# Patient Record
Sex: FEMALE | Race: WHITE | NOT HISPANIC OR LATINO | Employment: FULL TIME | URBAN - METROPOLITAN AREA
[De-identification: names, ages, dates, MRNs, and addresses within clinical notes are randomized per-mention and may not be internally consistent; named-entity substitution may affect disease eponyms.]

---
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
## 2018-01-12 VITALS
OXYGEN SATURATION: 99 % | SYSTOLIC BLOOD PRESSURE: 116 MMHG | BODY MASS INDEX: 22.03 KG/M2 | HEART RATE: 73 BPM | DIASTOLIC BLOOD PRESSURE: 64 MMHG | HEIGHT: 67 IN | WEIGHT: 140.38 LBS

## 2018-01-17 NOTE — PROGRESS NOTES
Assessment    1  Encounter for preventive health examination (V70 0) (Z00 00)   2  Family history of malignant neoplasm of uterus (V16 49) (Z80 49) : Maternal   Grandmother    Discussion/Summary    Impression:   No growth, development, elimination, feeding, skin and sleep concerns  no medical problems  Anticipatory guidance addressed as per the history of present illness section  No vaccines needed  She is not on any medications  Up to date with vaccines  continue eating healthy and exercise  Chief Complaint  Patient presents to office today for a Wellness visit  History of Present Illness  HM, 12-18 years Female (Brief):   General Health: The child's health since the last visit is described as good  Immunization status: Up to date  Caregiver concerns:   Caregivers deny concerns regarding nutrition, sleep, behavior and school  Menstrual status: The patient is menarcheal    Nutrition/Elimination:   Diet:  her current diet is diverse and healthy  The patient does not use dietary supplements  Sleep:  No sleep issues are reported  Behavior: The child's temperament is described as calm and happy  No behavior issues identified  Health Risks:  No significant risk factors are identified  Childcare/School: Childcare is provided in the child's home  She is in grade 12 in [employer] high school  School performance has been excellent  Sports Participation Questions:   HPI: patient is here for a wellness      Active Problems    1  Iliotibial band syndrome of left side (728 89) (M76 32)   2  Left knee pain (719 46) (M25 562)   3  Mild persistent asthma without complication (225 20) (A59 47)   4  Pain of patellofemoral joint, left (719 46) (M25 562)   5  Patellofemoral disorders, left knee (719 96) (M22 2X2)   6  Patellofemoral pain syndrome (719 46) (M22 2X9)   7   Upper back pain on right side (724 5) (M54 9)    Past Medical History    · History of Neurologic Disorder (V12 40)    Family History  Maternal Grandmother    · Family history of malignant neoplasm of uterus (V16 49) (Z80 49)  Other    · Family history of cerebrovascular accident (CVA) (V17 1) (Z82 3)  Family History    · Family history of Arthritis (716 90) (M19 90)   · Family history of osteoporosis (V17 81) (Z82 62)   · Family history of Hypertension (V17 49)   · Family history of Varicose Veins Of Lower Extremities    Social History    · Denied: History of Alcohol Use (History)   · Daily caffeinated cola consumption   · Drinks coffee   · Never A Smoker    Current Meds   1  Dulera 100-5 MCG/ACT Inhalation Aerosol; INHALE 2 PUFFS AT 12 HOUR INTERVALS   (MORNING AND EVENING); Therapy: [id number] to (Evaluate:[id number])  Requested for: 25Apr2017; Last   Rx:[id number] Ordered   2  Dulera 100-5 MCG/ACT Inhalation Aerosol; Therapy: (Recorded:27Oct2015) to Recorded    Allergies    1  No Known Drug Allergies    Vitals   Recorded: 22Aug2017 10:17AM   Heart Rate 68   Systolic 833   Diastolic 64   Height 5 ft 7 01 in   Weight 140 lb 12 8 oz   BMI Calculated 22 04   BSA Calculated 1 74   BMI Percentile 56 %   2-20 Stature Percentile 86 %   2-20 Weight Percentile 72 %   O2 Saturation 97     Physical Exam    Constitutional - General appearance: No acute distress, well appearing and well nourished  Eyes - Conjunctiva and lids: No injection, edema or discharge  Pupils and irises: Equal, round, reactive to light bilaterally  Ears, Nose, Mouth, and Throat - External inspection of ears and nose: Normal without deformities or discharge  Otoscopic examination: Tympanic membranes gray, translucent with good bony landmarks and light reflex  Canals patent without erythema  Oropharynx: Moist mucosa, normal tongue and tonsils without lesions  Neck - Neck: Supple, symmetric, no masses  Pulmonary - Respiratory effort: Normal respiratory rate and rhythm, no increased work of breathing  Auscultation of lungs: Clear bilaterally     Cardiovascular - Auscultation of heart: Regular rate and rhythm, normal S1 and S2, no murmur  Pedal pulses: Normal, 2+ bilaterally  Examination of extremities for edema and/or varicosities: Normal    Abdomen - Abdomen: Normal bowel sounds, soft, non-tender, no masses  Liver and spleen: No hepatomegaly or splenomegaly  Lymphatic - Palpation of lymph nodes in neck: No anterior or posterior cervical lymphadenopathy  Musculoskeletal - Gait and station: Normal gait  Digits and nails: Normal without clubbing or cyanosis   Inspection/palpation of joints, bones, and muscles: Normal    Skin - Skin and subcutaneous tissue: Normal    Psychiatric - Orientation to person, place, and time: Normal  Mood and affect: Normal       Future Appointments    Date/Time Provider Specialty Site   08/24/2018 10:30 AM MIRNA Baeza Nurse Practitioner [facility]     Signatures   Electronically signed by : Sri Hutton; Sep 18 2017 11:52AM EST                       (Author)    Electronically signed by : ANG Hodge ; Sep 18 2017  2:33PM EST                       (Review)

## 2018-01-22 VITALS
OXYGEN SATURATION: 97 % | WEIGHT: 140.8 LBS | HEART RATE: 68 BPM | BODY MASS INDEX: 22.1 KG/M2 | HEIGHT: 67 IN | SYSTOLIC BLOOD PRESSURE: 102 MMHG | DIASTOLIC BLOOD PRESSURE: 64 MMHG

## 2018-05-10 ENCOUNTER — OFFICE VISIT (OUTPATIENT)
Dept: INTERNAL MEDICINE CLINIC | Facility: CLINIC | Age: 18
End: 2018-05-10
Payer: COMMERCIAL

## 2018-05-10 VITALS
OXYGEN SATURATION: 98 % | TEMPERATURE: 98.2 F | HEIGHT: 67 IN | RESPIRATION RATE: 16 BRPM | SYSTOLIC BLOOD PRESSURE: 112 MMHG | BODY MASS INDEX: 22.41 KG/M2 | DIASTOLIC BLOOD PRESSURE: 64 MMHG | HEART RATE: 74 BPM | WEIGHT: 142.8 LBS

## 2018-05-10 DIAGNOSIS — J45.20 MILD INTERMITTENT ASTHMA, UNSPECIFIED WHETHER COMPLICATED: Primary | ICD-10-CM

## 2018-05-10 PROCEDURE — 3008F BODY MASS INDEX DOCD: CPT | Performed by: NURSE PRACTITIONER

## 2018-05-10 PROCEDURE — 99213 OFFICE O/P EST LOW 20 MIN: CPT | Performed by: NURSE PRACTITIONER

## 2018-05-11 NOTE — PROGRESS NOTES
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    Subjective:      Patient ID: Cande Hutson is a 21 y o  female  Patient is here for follow up of asthma      Asthma   She complains of chest tightness  This is a chronic problem  The current episode started more than 1 year ago  The problem occurs every several days  The problem has been unchanged  Her symptoms are aggravated by exercise  Her symptoms are alleviated by beta-agonist  She reports complete improvement on treatment  Her past medical history is significant for asthma  The following portions of the patient's history were reviewed and updated as appropriate: allergies, current medications, past family history, past medical history, past social history, past surgical history and problem list     Review of Systems   Constitutional: Negative  HENT: Negative  Eyes: Negative  Respiratory: Negative  Cardiovascular: Negative  Gastrointestinal: Negative  Musculoskeletal: Negative  Neurological: Negative  Objective:      /64 (BP Location: Left arm, Patient Position: Sitting, Cuff Size: Adult)   Pulse 74   Temp 98 2 °F (36 8 °C) (Oral)   Resp 16   Ht 5' 6 5" (1 689 m)   Wt 64 8 kg (142 lb 12 8 oz)   LMP 04/30/2018 (Approximate)   SpO2 98%   BMI 22 70 kg/m²          Physical Exam   Constitutional: She is oriented to person, place, and time  She appears well-developed and well-nourished  HENT:   Head: Normocephalic and atraumatic  Eyes: Conjunctivae are normal  Pupils are equal, round, and reactive to light  Neck: Normal range of motion  Neck supple  Cardiovascular: Normal rate and regular rhythm  Pulmonary/Chest: Effort normal and breath sounds normal    Abdominal: Soft  Bowel sounds are normal    Musculoskeletal: Normal range of motion  Neurological: She is alert and oriented to person, place, and time  Skin: Skin is warm and dry

## 2018-08-21 RX ORDER — NORGESTIMATE AND ETHINYL ESTRADIOL 7DAYSX3 28
KIT ORAL
Refills: 12 | COMMUNITY
Start: 2018-07-31 | End: 2018-11-09 | Stop reason: SDUPTHER

## 2018-08-24 ENCOUNTER — OFFICE VISIT (OUTPATIENT)
Dept: INTERNAL MEDICINE CLINIC | Facility: CLINIC | Age: 18
End: 2018-08-24
Payer: COMMERCIAL

## 2018-08-24 VITALS
WEIGHT: 138 LBS | SYSTOLIC BLOOD PRESSURE: 104 MMHG | BODY MASS INDEX: 21.66 KG/M2 | OXYGEN SATURATION: 98 % | HEART RATE: 79 BPM | HEIGHT: 67 IN | DIASTOLIC BLOOD PRESSURE: 82 MMHG

## 2018-08-24 DIAGNOSIS — J45.990 EXERCISE-INDUCED ASTHMA: ICD-10-CM

## 2018-08-24 DIAGNOSIS — Z00.00 HEALTHCARE MAINTENANCE: Primary | ICD-10-CM

## 2018-08-24 DIAGNOSIS — Z12.4 SCREENING FOR MALIGNANT NEOPLASM OF CERVIX: ICD-10-CM

## 2018-08-24 PROCEDURE — 99395 PREV VISIT EST AGE 18-39: CPT | Performed by: NURSE PRACTITIONER

## 2018-08-24 NOTE — PROGRESS NOTES
Assessment/Plan:    No problem-specific Assessment & Plan notes found for this encounter  Diagnoses and all orders for this visit:    Healthcare maintenance    Screening for malignant neoplasm of cervix  -     Ambulatory referral to Gynecology; Future    Exercise-induced asthma  -     beclomethasone (QVAR) 80 MCG/ACT inhaler; Inhale 2 puffs 2 (two) times a day Rinse mouth after use  Other orders  -     [place] 0 18/0 215/0 25 MG-35 MCG per tablet; Use as directed        · Patient Counseling:   ? Nutrition: Stressed importance of a well balanced diet, moderation of sodium/saturated fat, caloric balance and sufficient intake of fiber  ? Exercise: Stressed the importance of regular exercise with a goal of 150 minutes per week  ? Dental Health: Discussed daily flossing and brushing and regular dental visits      · Immunizations reviewed yes, up to date      Subjective:      Patient ID: Betty Pham is a 21 y o  female  Pt is here for a wellness exam      Asthma   This is a chronic problem  The current episode started more than 1 year ago  The problem occurs rarely  The problem has been unchanged  Her past medical history is significant for asthma  The following portions of the patient's history were reviewed and updated as appropriate: allergies, current medications, past family history, past medical history, past social history, past surgical history and problem list     Review of Systems   Constitutional: Negative  HENT: Negative  Eyes: Negative  Respiratory: Negative  Cardiovascular: Negative  Gastrointestinal: Negative  Musculoskeletal: Negative  Neurological: Negative          Family History   Problem Relation Age of Onset    Uterine cancer Maternal Grandmother     Arthritis Family     Osteoarthritis Family     Hypertension Family     Varicose Veins Family         lower extremities    Stroke Other      Past Medical History:   Diagnosis Date    Neurologic disorder     Black outs     Social History     Social History    Marital status: Single     Spouse name: N/A    Number of children: N/A    Years of education: N/A     Occupational History    Not on file  Social History Main Topics    Smoking status: Never Smoker    Smokeless tobacco: Never Used    Alcohol use No    Drug use: Unknown    Sexual activity: Not on file     Other Topics Concern    Not on file     Social History Narrative    Daily caffeinated cola consumption    Drinks coffee           Current Outpatient Prescriptions:     TRI FEMYNOR 0 18/0 215/0 25 MG-35 MCG per tablet, Use as directed, Disp: , Rfl: 12    beclomethasone (QVAR) 80 MCG/ACT inhaler, Inhale 2 puffs 2 (two) times a day Rinse mouth after use , Disp: 1 Inhaler, Rfl: 2  No Known Allergies      Objective:      /82 (BP Location: Left arm, Patient Position: Sitting, Cuff Size: Adult)   Pulse 79   Ht 5' 6 5" (1 689 m)   Wt 62 6 kg (138 lb)   LMP 07/31/2018 (Approximate)   SpO2 98%   BMI 21 94 kg/m²          Physical Exam   Constitutional: She is oriented to person, place, and time  She appears well-developed and well-nourished  HENT:   Head: Normocephalic and atraumatic  Eyes: Conjunctivae are normal  Pupils are equal, round, and reactive to light  Neck: Normal range of motion  Neck supple  Cardiovascular: Normal rate and regular rhythm  Pulmonary/Chest: Effort normal and breath sounds normal    Abdominal: Soft  Bowel sounds are normal    Musculoskeletal: Normal range of motion  Neurological: She is alert and oriented to person, place, and time  Skin: Skin is warm and dry

## 2018-11-09 ENCOUNTER — OFFICE VISIT (OUTPATIENT)
Dept: OBGYN CLINIC | Facility: CLINIC | Age: 18
End: 2018-11-09
Payer: COMMERCIAL

## 2018-11-09 VITALS
SYSTOLIC BLOOD PRESSURE: 108 MMHG | BODY MASS INDEX: 22 KG/M2 | DIASTOLIC BLOOD PRESSURE: 64 MMHG | HEIGHT: 67 IN | WEIGHT: 140.2 LBS

## 2018-11-09 DIAGNOSIS — Z30.41 ENCOUNTER FOR SURVEILLANCE OF CONTRACEPTIVE PILLS: Primary | ICD-10-CM

## 2018-11-09 PROCEDURE — 87591 N.GONORRHOEAE DNA AMP PROB: CPT | Performed by: OBSTETRICS & GYNECOLOGY

## 2018-11-09 PROCEDURE — 99385 PREV VISIT NEW AGE 18-39: CPT | Performed by: OBSTETRICS & GYNECOLOGY

## 2018-11-09 PROCEDURE — 87491 CHLMYD TRACH DNA AMP PROBE: CPT | Performed by: OBSTETRICS & GYNECOLOGY

## 2018-11-09 RX ORDER — NORGESTIMATE AND ETHINYL ESTRADIOL 7DAYSX3 28
1 KIT ORAL DAILY
Qty: 28 TABLET | Refills: 11 | Status: SHIPPED | OUTPATIENT
Start: 2018-11-09 | End: 2019-10-21 | Stop reason: SDUPTHER

## 2018-11-09 NOTE — PROGRESS NOTES
ASSESSMENT & PLAN: Venkat Pablo is a 19 y o  [place] [employer] with normal gynecologic exam     1   Routine well woman exam done today  2   Pap testing will start at age 22 per the ASCCP guidelines  3   The patient accepted STD testing  chlamydia and gonorrhea testing performed  Safe sex practices have been discussed  4   Gardasil is recommended for patients from 526 years of age  The patient has had the Gardasil vaccine series  5  The patient is sexually active  She accepted contraception and options have been discussed  OCPs refilled  6  The following were reviewed in today's visit: breast self exam, STD testing and family planning choices  7  Patient to return to office in 12 months for annual exam      All questions have been answered to her satisfaction  CC:  Annual Gynecologic Examination    HPI: Venkat Pablo is a 19 y o  [place] who presents for annual gynecologic examination  She has the following concerns:  none    Health Maintenance:    She exercises 6 days per week  She wears her seatbelt routinely  She does not perform regular monthly self breast exams  She feels safe at home  She does follow a well balanced diet  She does not use tobacco    Past Medical History:   Diagnosis Date    Neurologic disorder     Black outs       History reviewed  No pertinent surgical history  Past OB/Gyn History:  Period Cycle (Days): 28  Period Duration (Days): 7  Period Pattern: Regular  Menstrual Flow: Moderate  Dysmenorrhea: (!) Mild  Dysmenorrhea Symptoms: CrampingPatient's last menstrual period was 10/20/2018 (exact date)  Menstrual History:  OB History      Para Term  AB Living    1       1      SAB TAB Ectopic Multiple Live Births                      Menarche age: 13  Patient's last menstrual period was 10/20/2018 (exact date)    Period Cycle (Days): 28  Period Duration (Days): 7  Period Pattern: Regular  Menstrual Flow: Moderate  Dysmenorrhea: (!) Mild  Dysmenorrhea Symptoms: Cramping    History of sexually transmitted infection No  Patient is currently sexually active  heterosexual Birth control: combination OCPs  Obstetric History       T0      L0     SAB0   TAB0   Ectopic0   Multiple0   Live Births0       # Outcome Date GA Lbr Josh/2nd Weight Sex Delivery Anes PTL Lv   1 AB 2017                  Family History:  Family History   Problem Relation Age of Onset    Uterine cancer Maternal Grandmother     Arthritis Family     Osteoarthritis Family     Hypertension Family     Varicose Veins Family         lower extremities    Stroke Other     Multiple sclerosis Mother        Social History:  Social History     Social History    Marital status: Single     Spouse name: N/A    Number of children: N/A    Years of education: N/A     Occupational History    Not on file  Social History Main Topics    Smoking status: Never Smoker    Smokeless tobacco: Never Used    Alcohol use No    Drug use: No    Sexual activity: Yes     Partners: Male     Birth control/ protection: OCP     Other Topics Concern    Not on file     Social History Narrative    Daily caffeinated cola consumption    Drinks coffee         Presently lives with mom  Patient is single  Patient is currently employed as a     No Known Allergies    Current Outpatient Prescriptions:     beclomethasone (QVAR) 80 MCG/ACT inhaler, Inhale 2 puffs 2 (two) times a day Rinse mouth after use , Disp: 1 Inhaler, Rfl: 2    TRI FEMYNOR 0 18/0 215/0 25 MG-35 MCG per tablet, Use as directed, Disp: , Rfl: 12    Review of Systems:  Denies fevers, chills, unintentional weight loss, excessive fatigue, chest pain, shortness of breath, abdominal pain, nausea, vomiting, urinary incontinence, urinary frequency, vaginal bleeding, vaginal discharge  All other systems negative unless otherwise stated       Physical Exam:  /64   Ht 5' 7" (1 702 m)   Wt 63 6 kg (140 lb 3 2 oz)   LMP 10/20/2018 (Exact Date)   Breastfeeding? No   BMI 21 96 kg/m²  Body mass index is 21 96 kg/m²  GEN: The patient was alert and oriented x3, pleasant well-appearing female in no acute distress  HEENT:  Unremarkable, no anterior or posterior lymphadenopathy, no thyromegaly  CV:  Regular rate and rhythm, normal S1 and S2, no murmurs  RESP:  Clear to auscultation bilaterally, no wheezes, rales or rhonchi  BREAST:  Symmetric breasts with no palpable breast masses or obvious breast lesions  She has no retractions or nipple discharge  She has no axillary abnormalities or palpable masses  GI:  Soft, nontender, non-distended  MSK: bilateral lower extremities are nontender, no edema  : Normal appearing external female genitalia, normal appearing urethral meatus  On bimanual exam, no cervical motion tenderness; uterus is smooth, mobile, nontender 6 weeks size  No tenderness or fullness in the bilateral adnexa

## 2018-11-14 LAB
C TRACH DNA SPEC QL NAA+PROBE: NEGATIVE
N GONORRHOEA DNA SPEC QL NAA+PROBE: NEGATIVE

## 2018-12-05 ENCOUNTER — OFFICE VISIT (OUTPATIENT)
Dept: INTERNAL MEDICINE CLINIC | Facility: CLINIC | Age: 18
End: 2018-12-05
Payer: COMMERCIAL

## 2018-12-05 VITALS
DIASTOLIC BLOOD PRESSURE: 66 MMHG | SYSTOLIC BLOOD PRESSURE: 115 MMHG | TEMPERATURE: 98.1 F | OXYGEN SATURATION: 97 % | BODY MASS INDEX: 21.53 KG/M2 | WEIGHT: 137.2 LBS | HEIGHT: 67 IN | HEART RATE: 82 BPM

## 2018-12-05 DIAGNOSIS — J06.9 ACUTE UPPER RESPIRATORY INFECTION: Primary | ICD-10-CM

## 2018-12-05 PROCEDURE — 99213 OFFICE O/P EST LOW 20 MIN: CPT | Performed by: NURSE PRACTITIONER

## 2018-12-05 PROCEDURE — 3008F BODY MASS INDEX DOCD: CPT | Performed by: NURSE PRACTITIONER

## 2018-12-05 RX ORDER — AZITHROMYCIN 250 MG/1
TABLET, FILM COATED ORAL
Qty: 6 TABLET | Refills: 0 | Status: SHIPPED | OUTPATIENT
Start: 2018-12-05 | End: 2018-12-10

## 2018-12-05 NOTE — PROGRESS NOTES
Assessment/Plan:     Diagnoses and all orders for this visit:    Acute upper respiratory infection  Comments:  take robitussin OTC for cough   Orders:  -     azithromycin (ZITHROMAX) 250 mg tablet; Take 2 tablets daily on day 1 then 1 tablet daily for the next 4 days          Subjective:      Patient ID: Jonnie Mendes is a 19 y o  female  Here for sore throat and cough   Started 2 weeks ago with cold symptoms   Nasal congestion resolved but cough has gotten worse   +productive cough, sore throat  Denies any fevers, myalgias, n/v/d            The following portions of the patient's history were reviewed and updated as appropriate: allergies, current medications, past family history, past medical history, past social history, past surgical history and problem list     Review of Systems   Constitutional: Negative  HENT: Positive for sore throat  Negative for congestion and rhinorrhea  Respiratory: Positive for cough  Negative for chest tightness, shortness of breath and wheezing  Cardiovascular: Negative  Gastrointestinal: Negative  Musculoskeletal: Negative for myalgias  Neurological: Negative  Objective:      /66   Pulse 82   Temp 98 1 °F (36 7 °C)   Ht 5' 7" (1 702 m)   Wt 62 2 kg (137 lb 3 2 oz)   SpO2 97%   BMI 21 49 kg/m²          Physical Exam   Constitutional: She is oriented to person, place, and time  She appears well-developed and well-nourished  HENT:   Right Ear: External ear normal    Left Ear: External ear normal    Mouth/Throat: Posterior oropharyngeal erythema present  Cardiovascular: Normal rate, regular rhythm and normal heart sounds  Pulmonary/Chest: Effort normal and breath sounds normal    Lymphadenopathy:     She has no cervical adenopathy  Neurological: She is alert and oriented to person, place, and time  Psychiatric: She has a normal mood and affect  Her behavior is normal    Vitals reviewed

## 2018-12-19 DIAGNOSIS — Z30.41 ENCOUNTER FOR SURVEILLANCE OF CONTRACEPTIVE PILLS: ICD-10-CM

## 2018-12-19 RX ORDER — NORGESTIMATE AND ETHINYL ESTRADIOL 7DAYSX3 28
1 KIT ORAL DAILY
Qty: 28 TABLET | Refills: 0 | Status: CANCELLED | OUTPATIENT
Start: 2018-12-19

## 2019-01-13 DIAGNOSIS — Z30.41 ENCOUNTER FOR SURVEILLANCE OF CONTRACEPTIVE PILLS: ICD-10-CM

## 2019-01-13 RX ORDER — NORGESTIMATE AND ETHINYL ESTRADIOL 7DAYSX3 28
1 KIT ORAL DAILY
Qty: 28 TABLET | Refills: 0 | Status: CANCELLED | OUTPATIENT
Start: 2019-01-13

## 2019-07-07 ENCOUNTER — TELEPHONE (OUTPATIENT)
Dept: OTHER | Facility: OTHER | Age: 19
End: 2019-07-07

## 2019-07-07 NOTE — TELEPHONE ENCOUNTER
Pt called to make an appointment for tomorrow  Pt states she has a very bad sun burn and needs an ointment prescribed  I offered her to get triaged by the nurse however she refused and said she rather be seen in office

## 2019-07-08 ENCOUNTER — OFFICE VISIT (OUTPATIENT)
Dept: INTERNAL MEDICINE CLINIC | Facility: CLINIC | Age: 19
End: 2019-07-08
Payer: COMMERCIAL

## 2019-07-08 VITALS
WEIGHT: 147.8 LBS | BODY MASS INDEX: 23.2 KG/M2 | DIASTOLIC BLOOD PRESSURE: 65 MMHG | TEMPERATURE: 97.7 F | OXYGEN SATURATION: 95 % | HEART RATE: 69 BPM | SYSTOLIC BLOOD PRESSURE: 111 MMHG | HEIGHT: 67 IN

## 2019-07-08 DIAGNOSIS — L55.9 SUNBURN: Primary | ICD-10-CM

## 2019-07-08 PROCEDURE — 99213 OFFICE O/P EST LOW 20 MIN: CPT | Performed by: INTERNAL MEDICINE

## 2019-07-08 PROCEDURE — 3008F BODY MASS INDEX DOCD: CPT | Performed by: INTERNAL MEDICINE

## 2019-07-08 PROCEDURE — 1036F TOBACCO NON-USER: CPT | Performed by: INTERNAL MEDICINE

## 2019-07-08 NOTE — PROGRESS NOTES
Assessment/Plan:    Sunburn  Discussed using cool compresses, Advil 400 mg every 6 hours as needed for pain and inflammation  Discussed using aloe gel which she is using  Discussed that if she were to develop blistering and open areas we could try something like Silvadene cream, but there are no current signs of this on exam   Discussed with patient to look for any signs of infection, and to call if they were to develop  There are currently no signs of infection  Patient does not look dehydrated  Follow up if not improving  Diagnoses and all orders for this visit:    Sunburn          Subjective:      Patient ID: Jeffrey Dave is a 19 y o  female  Patient [place] bad [place] on her face  She used to different face sunscreen the rest of her body  She has used that facial sun screen before without problems  No fever, no nausea, no vomiting, no headache  The following portions of the patient's history were reviewed and updated as appropriate: allergies, current medications, past family history, past medical history, past social history, past surgical history and problem list     Review of Systems      Objective:      /65   Pulse 69   Temp 97 7 °F (36 5 °C)   Ht 5' 7" (1 702 m)   Wt 67 kg (147 lb 12 8 oz)   SpO2 95%   BMI 23 15 kg/m²          Physical Exam   HENT:   No tongue or lip swelling, no eye swelling   Pulmonary/Chest: Effort normal and breath sounds normal  No stridor  No respiratory distress  She has no wheezes  She has no rales     Skin:   Patient has some dry cracking skin over areas of slightly raw newer skin, no purulence drainage, no erythema

## 2019-07-08 NOTE — ASSESSMENT & PLAN NOTE
Discussed using cool compresses, Advil 400 mg every 6 hours as needed for pain and inflammation  Discussed using aloe gel which she is using  Discussed that if she were to develop blistering and open areas we could try something like Silvadene cream, but there are no current signs of this on exam   Discussed with patient to look for any signs of infection, and to call if they were to develop  There are currently no signs of infection  Patient does not look dehydrated  Follow up if not improving

## 2019-07-08 NOTE — PATIENT INSTRUCTIONS
Problem List Items Addressed This Visit        Musculoskeletal and Integument    Sunburn - Primary     Discussed using cool compresses, Advil 400 mg every 6 hours as needed for pain and inflammation  Discussed using aloe gel which she is using  Discussed that if she were to develop blistering and open areas we could try something like Silvadene cream, but there are no current signs of this on exam   Discussed with patient to look for any signs of infection, and to call if they were to develop  There are currently no signs of infection  Patient does not look dehydrated  Follow up if not improving

## 2019-10-21 ENCOUNTER — TELEPHONE (OUTPATIENT)
Dept: OBGYN CLINIC | Facility: CLINIC | Age: 19
End: 2019-10-21

## 2019-10-21 DIAGNOSIS — Z30.41 ENCOUNTER FOR SURVEILLANCE OF CONTRACEPTIVE PILLS: ICD-10-CM

## 2019-10-21 RX ORDER — NORGESTIMATE AND ETHINYL ESTRADIOL 7DAYSX3 28
1 KIT ORAL DAILY
Qty: 28 TABLET | Refills: 1 | Status: SHIPPED | OUTPATIENT
Start: 2019-10-21 | End: 2019-11-19 | Stop reason: SDUPTHER

## 2019-11-19 DIAGNOSIS — Z30.41 ENCOUNTER FOR SURVEILLANCE OF CONTRACEPTIVE PILLS: ICD-10-CM

## 2019-11-19 RX ORDER — NORGESTIMATE AND ETHINYL ESTRADIOL 7DAYSX3 28
1 KIT ORAL DAILY
Qty: 84 TABLET | Refills: 0 | Status: SHIPPED | OUTPATIENT
Start: 2019-11-19 | End: 2019-11-19 | Stop reason: SDUPTHER

## 2019-11-19 NOTE — TELEPHONE ENCOUNTER
Calling for OCP refill - has annual scheduled for 11/20/19 - rescheduled to 12/20/19 - calling for refill to get her to annual exam   Jaz Mota she will need to keep appt for further refills  Pharmacy updated      Routing to provider to order

## 2019-12-03 ENCOUNTER — ANNUAL EXAM (OUTPATIENT)
Dept: OBGYN CLINIC | Facility: CLINIC | Age: 19
End: 2019-12-03
Payer: COMMERCIAL

## 2019-12-03 VITALS
SYSTOLIC BLOOD PRESSURE: 100 MMHG | BODY MASS INDEX: 23.9 KG/M2 | HEART RATE: 68 BPM | WEIGHT: 152.6 LBS | DIASTOLIC BLOOD PRESSURE: 62 MMHG

## 2019-12-03 DIAGNOSIS — Z01.419 ENCNTR FOR GYN EXAM (GENERAL) (ROUTINE) W/O ABN FINDINGS: Primary | ICD-10-CM

## 2019-12-03 DIAGNOSIS — Z30.011 ENCOUNTER FOR PRESCRIPTION OF ORAL CONTRACEPTIVES: ICD-10-CM

## 2019-12-03 DIAGNOSIS — Z20.2 POSSIBLE EXPOSURE TO STD: ICD-10-CM

## 2019-12-03 PROCEDURE — G0145 SCR C/V CYTO,THINLAYER,RESCR: HCPCS | Performed by: NURSE PRACTITIONER

## 2019-12-03 PROCEDURE — 87591 N.GONORRHOEAE DNA AMP PROB: CPT | Performed by: NURSE PRACTITIONER

## 2019-12-03 PROCEDURE — 87491 CHLMYD TRACH DNA AMP PROBE: CPT | Performed by: NURSE PRACTITIONER

## 2019-12-03 PROCEDURE — 99395 PREV VISIT EST AGE 18-39: CPT | Performed by: NURSE PRACTITIONER

## 2019-12-03 RX ORDER — LEVONORGESTREL AND ETHINYL ESTRADIOL 0.1-0.02MG
1 KIT ORAL DAILY
Qty: 84 TABLET | Refills: 3 | Status: SHIPPED | OUTPATIENT
Start: 2019-12-03 | End: 2020-03-27 | Stop reason: SDUPTHER

## 2019-12-03 RX ORDER — NORGESTIMATE AND ETHINYL ESTRADIOL 7DAYSX3 28
1 KIT ORAL DAILY
COMMUNITY
End: 2019-12-03 | Stop reason: ALTCHOICE

## 2019-12-03 NOTE — PATIENT INSTRUCTIONS
Pap every 3 years if normal-done, STI testing as indicated-GC/CT done, exercise most days of week, obtain appropriate diet and hydration, Calcium 1000mg + 600 vit D daily, birth control as directed (ACHES reviewed)  Benefits, risks and alternatives discussed/reviewed  Condom use when sexually active for sexually transmitted infection prevention  HPV vaccine series completed     Annual mammogram starting at age 36, monthly breast self exam

## 2019-12-03 NOTE — PROGRESS NOTES
Assessment/Plan:     Pap every 3 years if normal-done, STI testing as indicated-GC/CT done, exercise most days of week, obtain appropriate diet and hydration, Calcium 1000mg + 600 vit D daily, birth control as directed (ACHES reviewed)  Benefits, risks and alternatives discussed/reviewed  Condom use when sexually active for sexually transmitted infection prevention  HPV vaccine series completed  Annual mammogram starting at age 34, monthly breast self exam       Negative depression screen  Diagnoses and all orders for this visit:    Encntr for gyn exam (general) (routine) w/o abn findings  -     Liquid-based pap, screening    Possible exposure to STD  -     Chlamydia/GC amplified DNA by PCR    Encounter for prescription of oral contraceptives  -     levonorgestrel-ethinyl estradiol (Merilynn Drafts) 0 1-20 MG-MCG per tablet; Take 1 tablet by mouth daily    Other orders  -     Discontinue: norgestimate-ethinyl estradiol (TRI FEMYNOR) 0 18/0 215/0 25 MG-35 MCG per tablet; Take 1 tablet by mouth daily              Subjective:      Patient ID: Mo Garcia is a 22 y o  female  Mo Garcia is a 22 y o  female who is here today for her annual visit  She would like to change her triphasic birth control as she is often hyper emotional the week before her menses every month  Symptoms resolve a week later  No other health concerns  Monthly menses x 5 days with mod flow  Menses is acceptable  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[place]  Mo Garcia is sexually active with male partner of 3 years  Monogamous  No condom use  The following portions of the patient's history were reviewed and updated as appropriate: allergies, current medications, past family history, past medical history, past social history, past surgical history and problem list     Review of Systems   Constitutional: Negative    Negative for activity change, appetite change, chills, diaphoresis, fatigue, fever and unexpected weight change  HENT: Negative for congestion, dental problem, sneezing, sore throat and trouble swallowing  Eyes: Negative for visual disturbance  Respiratory: Negative for chest tightness and shortness of breath  Cardiovascular: Negative for chest pain and leg swelling  Gastrointestinal: Negative for abdominal pain, constipation, diarrhea, nausea and vomiting  Genitourinary: Negative for difficulty urinating, dyspareunia, dysuria, frequency, hematuria, menstrual problem, pelvic pain, urgency, vaginal bleeding, vaginal discharge and vaginal pain  Musculoskeletal: Negative for back pain and neck pain  Skin: Negative  Allergic/Immunologic: Negative  Neurological: Negative for weakness and headaches  Hematological: Negative for adenopathy  Psychiatric/Behavioral: Negative  Hyper emotional week before menses         Objective:      /62 (BP Location: Right arm, Patient Position: Sitting, Cuff Size: Standard)   Pulse 68   Wt 69 2 kg (152 lb 9 6 oz)   LMP 11/19/2019   BMI 23 90 kg/m²          Physical Exam   Constitutional: She is oriented to person, place, and time  Vital signs are normal  She appears well-developed and well-nourished  HENT:   Head: Normocephalic and atraumatic  Eyes: Right eye exhibits no discharge  Left eye exhibits no discharge  Neck: Trachea normal and normal range of motion  Neck supple  No thyromegaly present  Cardiovascular: Normal rate, regular rhythm, normal heart sounds and intact distal pulses  Pulmonary/Chest: Effort normal and breath sounds normal  Right breast exhibits no inverted nipple, no mass, no nipple discharge, no skin change and no tenderness  Left breast exhibits no inverted nipple, no mass, no nipple discharge, no skin change and no tenderness  No breast tenderness, discharge or bleeding  Breasts are symmetrical    Abdominal: Soft  Normal appearance     Genitourinary: Vagina normal and uterus normal  Rectal exam shows no external hemorrhoid  No breast tenderness, discharge or bleeding  Pelvic exam was performed with patient supine  No labial fusion  There is no rash, tenderness, lesion or injury on the right labia  There is no rash, tenderness, lesion or injury on the left labia  Cervix exhibits no motion tenderness, no discharge and no friability  Right adnexum displays no mass, no tenderness and no fullness  Left adnexum displays no mass, no tenderness and no fullness  No erythema, tenderness or bleeding in the vagina  No foreign body in the vagina  No signs of injury around the vagina  No vaginal discharge found  Musculoskeletal: Normal range of motion  Lymphadenopathy:        Head (right side): No submental, no submandibular and no tonsillar adenopathy present  Head (left side): No submental, no submandibular and no tonsillar adenopathy present  She has no cervical adenopathy  She has no axillary adenopathy  No inguinal adenopathy noted on the right or left side  Right: No inguinal adenopathy present  Left: No inguinal adenopathy present  Neurological: She is alert and oriented to person, place, and time  Skin: Skin is warm and dry  Psychiatric: She has a normal mood and affect  Nursing note and vitals reviewed

## 2019-12-04 LAB
C TRACH DNA SPEC QL NAA+PROBE: NEGATIVE
N GONORRHOEA DNA SPEC QL NAA+PROBE: NEGATIVE

## 2019-12-05 LAB
LAB AP GYN PRIMARY INTERPRETATION: NORMAL
Lab: NORMAL

## 2020-03-27 DIAGNOSIS — Z30.011 ENCOUNTER FOR PRESCRIPTION OF ORAL CONTRACEPTIVES: ICD-10-CM

## 2020-03-30 RX ORDER — LEVONORGESTREL AND ETHINYL ESTRADIOL 0.1-0.02MG
1 KIT ORAL DAILY
Qty: 84 TABLET | Refills: 2 | Status: SHIPPED | OUTPATIENT
Start: 2020-03-30 | End: 2020-11-30 | Stop reason: SDUPTHER

## 2020-11-27 DIAGNOSIS — Z30.011 ENCOUNTER FOR PRESCRIPTION OF ORAL CONTRACEPTIVES: ICD-10-CM

## 2020-11-28 RX ORDER — LEVONORGESTREL AND ETHINYL ESTRADIOL 0.1-0.02MG
KIT ORAL
Qty: 84 TABLET | Refills: 3 | OUTPATIENT
Start: 2020-11-28

## 2020-11-30 ENCOUNTER — TELEPHONE (OUTPATIENT)
Dept: OBGYN CLINIC | Facility: CLINIC | Age: 20
End: 2020-11-30

## 2020-11-30 DIAGNOSIS — Z30.011 ENCOUNTER FOR PRESCRIPTION OF ORAL CONTRACEPTIVES: ICD-10-CM

## 2020-11-30 RX ORDER — LEVONORGESTREL AND ETHINYL ESTRADIOL 0.1-0.02MG
1 KIT ORAL DAILY
Qty: 84 TABLET | Refills: 4 | Status: SHIPPED | OUTPATIENT
Start: 2020-11-30 | End: 2021-01-26 | Stop reason: SDUPTHER

## 2021-01-18 ENCOUNTER — TELEPHONE (OUTPATIENT)
Dept: OBGYN CLINIC | Facility: CLINIC | Age: 21
End: 2021-01-18

## 2021-01-26 ENCOUNTER — ANNUAL EXAM (OUTPATIENT)
Dept: OBGYN CLINIC | Facility: CLINIC | Age: 21
End: 2021-01-26
Payer: COMMERCIAL

## 2021-01-26 VITALS
WEIGHT: 151 LBS | SYSTOLIC BLOOD PRESSURE: 116 MMHG | HEIGHT: 66 IN | BODY MASS INDEX: 24.27 KG/M2 | DIASTOLIC BLOOD PRESSURE: 62 MMHG

## 2021-01-26 DIAGNOSIS — Z01.419 WELL WOMAN EXAM WITH ROUTINE GYNECOLOGICAL EXAM: Primary | ICD-10-CM

## 2021-01-26 DIAGNOSIS — Z30.011 ENCOUNTER FOR PRESCRIPTION OF ORAL CONTRACEPTIVES: ICD-10-CM

## 2021-01-26 PROBLEM — Z20.2 POSSIBLE EXPOSURE TO STD: Status: RESOLVED | Noted: 2019-12-03 | Resolved: 2021-01-26

## 2021-01-26 PROCEDURE — 1036F TOBACCO NON-USER: CPT | Performed by: OBSTETRICS & GYNECOLOGY

## 2021-01-26 PROCEDURE — 99395 PREV VISIT EST AGE 18-39: CPT | Performed by: OBSTETRICS & GYNECOLOGY

## 2021-01-26 PROCEDURE — 3008F BODY MASS INDEX DOCD: CPT | Performed by: OBSTETRICS & GYNECOLOGY

## 2021-01-26 RX ORDER — LEVONORGESTREL AND ETHINYL ESTRADIOL 0.1-0.02MG
1 KIT ORAL DAILY
Qty: 84 TABLET | Refills: 4 | Status: SHIPPED | OUTPATIENT
Start: 2021-01-26 | End: 2022-04-25

## 2021-01-26 NOTE — PROGRESS NOTES
ASSESSMENT & PLAN: Norberto Graham is a 23 y o  [place]valejose Salinas with normal gynecologic exam     1   Routine well woman exam done today  2    Pap and HPV: Pap with reflex HPV was not done today  Current ASCCP Guidelines reviewed  3   The patient declined STD testing  Safe sex practices have been discussed  4   Gardasil is recommended for patients from 526 years of age  The patient has had the Gardasil vaccine series  5  The patient is sexually active  She accepted contraception and options have been discussed  CHCs refilled  6  The following were reviewed in today's visit: breast self exam, STD testing, use and side effects of OCPs, family planning choices, exercise and healthy diet  7  Patient to return to office in 12 months for annual exam      All questions have been answered to her satisfaction  CC:  Annual Gynecologic Examination    HPI: Norberto Graham is a 23 y o  Estefani Salinas who presents for annual gynecologic examination  She has the following concerns:  none    Health Maintenance:    She exercises 4 days per week  She wears her seatbelt routinely  She does perform regular monthly self breast exams  She feels safe at home  She does follow a well balanced diet  She does not use tobacco    Past Medical History:   Diagnosis Date    Neurologic disorder     Black outs    Varicella        History reviewed  No pertinent surgical history  Past OB/Gyn History:      Period Cycle (Days): 28  Period Duration (Days): (4-7 days)  Period Pattern: Regular  Menstrual Flow: Moderate  Menstrual Control: Thin pad, Maxi pad  Dysmenorrhea: NonePatient's last menstrual period was 01/11/2021 (exact date)  History of sexually transmitted infection No  Patient is currently sexually active  heterosexual Birth control: combination OCPs  Last Pap 2019 :  no abnormalities        Family History:  Family History   Problem Relation Age of Onset    Cancer Maternal Grandmother     Arthritis Family     Osteoarthritis Family     Hypertension Family     Varicose Veins Family         lower extremities    Stroke Other     Multiple sclerosis Mother     Stroke Maternal Grandfather         At least 2    Kidney disease Maternal Grandfather     Uterine cancer Maternal Aunt        Social History:  Social History     Socioeconomic History    Marital status: Single     Spouse name: Not on file    Number of children: Not on file    Years of education: Not on file    Highest education level: Not on file   Occupational History    Not on file   Social Needs    Financial resource strain: Not on file    Food insecurity     Worry: Not on file     Inability: Not on file    Transportation needs     Medical: Not on file     Non-medical: Not on file   Tobacco Use    Smoking status: Never Smoker    Smokeless tobacco: Never Used   Substance and Sexual Activity    Alcohol use: Yes     Alcohol/week: 1 0 - 3 0 standard drinks     Types: 1 - 3 Glasses of wine per week     Frequency: Monthly or less    Drug use: Yes     Types: Marijuana    Sexual activity: Yes     Partners: Male     Birth control/protection: OCP     Comment: x 3 years   Lifestyle    Physical activity     Days per week: Not on file     Minutes per session: Not on file    Stress: Not on file   Relationships    Social connections     Talks on phone: Not on file     Gets together: Not on file     Attends [employer] service: Not on file     Active member of club or organization: Not on file     Attends meetings of clubs or organizations: Not on file     Relationship status: Not on file    Intimate partner violence     Fear of current or ex partner: Not on file     Emotionally abused: Not on file     Physically abused: Not on file     Forced sexual activity: Not on file   Other Topics Concern    Not on file   Social History Narrative    Daily caffeinated cola consumption    Drinks coffee     Presently lives with her mom and mom's boyfriend    Patient is single  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    No Known Allergies    Current Outpatient Medications:     beclomethasone (QVAR) 80 MCG/ACT inhaler, Inhale 2 puffs 2 (two) times a day Rinse mouth after use , Disp: 1 Inhaler, Rfl: 2    levonorgestrel-ethinyl estradiol (AVIANE,ALESSE,LESSINA) 0 1-20 MG-MCG per tablet, Take 1 tablet by mouth daily, Disp: 84 tablet, Rfl: 4    Review of Systems:  Denies fevers, chills, unintentional weight loss, excessive fatigue, chest pain, shortness of breath, abdominal pain, nausea, vomiting, urinary incontinence, urinary frequency, vaginal bleeding, vaginal discharge  All other systems negative unless otherwise stated  Physical Exam:  /62   Ht 5' 6" (1 676 m)   Wt 68 5 kg (151 lb)   LMP 01/11/2021 (Exact Date)   BMI 24 37 kg/m²  Body mass index is 24 37 kg/m²  GEN: The patient was alert and oriented x3, pleasant well-appearing female in no acute distress  HEENT:  Unremarkable, no anterior or posterior lymphadenopathy, no thyromegaly  CV:  Regular rate and rhythm, normal S1 and S2, no murmurs  RESP:  Clear to auscultation bilaterally, no wheezes, rales or rhonchi  BREAST:  Symmetric breasts with no palpable breast masses or obvious breast lesions  She has no retractions or nipple discharge  She has no axillary abnormalities or palpable masses  GI:  Soft, nontender, non-distended  MSK: bilateral lower extremities are nontender, no edema  : Normal appearing external female genitalia, normal appearing urethral meatus  On bimanual exam, no cervical motion tenderness; uterus is smooth, mobile, nontender 6 weeks size  No tenderness or fullness in the bilateral adnexa

## 2021-03-09 ENCOUNTER — APPOINTMENT (OUTPATIENT)
Dept: RADIOLOGY | Age: 21
End: 2021-03-09
Payer: OTHER MISCELLANEOUS

## 2021-03-09 ENCOUNTER — OCCMED (OUTPATIENT)
Dept: URGENT CARE | Age: 21
End: 2021-03-09
Payer: OTHER MISCELLANEOUS

## 2021-03-09 DIAGNOSIS — T14.90XA INJURY: ICD-10-CM

## 2021-03-09 DIAGNOSIS — T14.90XA INJURY: Primary | ICD-10-CM

## 2021-03-09 PROCEDURE — G0382 LEV 3 HOSP TYPE B ED VISIT: HCPCS | Performed by: PHYSICIAN ASSISTANT

## 2021-03-09 PROCEDURE — 99283 EMERGENCY DEPT VISIT LOW MDM: CPT | Performed by: PHYSICIAN ASSISTANT

## 2021-03-09 PROCEDURE — 73630 X-RAY EXAM OF FOOT: CPT

## 2021-03-09 PROCEDURE — 73610 X-RAY EXAM OF ANKLE: CPT

## 2021-03-15 ENCOUNTER — APPOINTMENT (OUTPATIENT)
Dept: URGENT CARE | Age: 21
End: 2021-03-15
Payer: OTHER MISCELLANEOUS

## 2021-03-15 PROCEDURE — 99213 OFFICE O/P EST LOW 20 MIN: CPT | Performed by: PREVENTIVE MEDICINE

## 2021-03-23 ENCOUNTER — APPOINTMENT (OUTPATIENT)
Dept: URGENT CARE | Age: 21
End: 2021-03-23
Payer: OTHER MISCELLANEOUS

## 2021-03-23 PROCEDURE — 99213 OFFICE O/P EST LOW 20 MIN: CPT | Performed by: PHYSICIAN ASSISTANT

## 2021-03-30 ENCOUNTER — EVALUATION (OUTPATIENT)
Dept: PHYSICAL THERAPY | Age: 21
End: 2021-03-30
Payer: OTHER MISCELLANEOUS

## 2021-03-30 DIAGNOSIS — S93.492A SPRAIN OF ANTERIOR TALOFIBULAR LIGAMENT OF LEFT ANKLE, INITIAL ENCOUNTER: Primary | ICD-10-CM

## 2021-03-30 PROCEDURE — 97161 PT EVAL LOW COMPLEX 20 MIN: CPT | Performed by: PHYSICAL THERAPIST

## 2021-03-30 PROCEDURE — 97110 THERAPEUTIC EXERCISES: CPT | Performed by: PHYSICAL THERAPIST

## 2021-03-30 NOTE — PROGRESS NOTES
PT Evaluation     Today's date: 3/30/2021  Patient name: Sury Powell  : 2000  MRN: [id number]  Referring provider: Ziggy Cross MD  Dx:   Encounter Diagnosis     ICD-10-CM    1  Sprain of anterior talofibular ligament of left ankle, initial encounter  S93 492A                   Assessment  Assessment details: Pt reports to PT with cc of L ankle pain after injury at work roughly 2 weeks ago  Pt has pain consistent with sprain of ATFL and will benefit from skilled PT in order to improve LE strength and ROM and return to PLOF     Impairments: abnormal coordination, abnormal gait, abnormal muscle firing, abnormal muscle tone, abnormal or restricted ROM, abnormal movement, activity intolerance, impaired physical strength, lacks appropriate home exercise program, pain with function, weight-bearing intolerance, poor posture  and poor body mechanics    Goals  In 4 weeks pt will:  -Be independent with phase I of HEP  -Increase LE strength by 1/2 grade  -Demonstrate full LE ROM    By discharge pt will:  -Be independent with Phase II of HEP  -Demonstrate 5/5 B LE strength  -Report minimal difficulty with ADLs      Plan  Patient would benefit from: skilled physical therapy  Planned modality interventions: cryotherapy, electrical stimulation/Russian stimulation, TENS and thermotherapy: hydrocollator packs  Planned therapy interventions: joint mobilization, manual therapy, motor coordination training, muscle pump exercises, neuromuscular re-education, patient education, strengthening, stretching, body mechanics training, functional ROM exercises, therapeutic exercise, therapeutic activities and home exercise program  Frequency: 2x week  Duration in weeks: 6  Plan of Care beginning date: 3/30/2021  Plan of Care expiration date: 2021  Treatment plan discussed with: PTA and patient        Subjective Evaluation    History of Present Illness  Mechanism of injury: Pt reports to PT with cc of L ankle pain after injury at work roughly 2 weeks ago  Pt has been to urgent care and seen physician  Pain  Current pain ratin  At best pain ratin  At worst pain ratin    Patient Goals  Patient goals for therapy: decreased pain, improved balance, increased motion, increased strength, independence with ADLs/IADLs, return to sport/leisure activities and return to work          Objective     Passive Range of Motion   Left Ankle/Foot    Dorsiflexion (ke): 14 degrees   Dorsiflexion (kf): 16 degrees   Inversion: 25 degrees   Eversion: 20 degrees   Great toe extension: WFL    Strength/Myotome Testing     Left Ankle/Foot   Dorsiflexion: 3+  Plantar flexion: 3+  Inversion: 3+  Eversion: 3+    Right Ankle/Foot   Dorsiflexion: 5  Plantar flexion: 5  Inversion: 5  Eversion: 5    Tests   Left Ankle/Foot   Positive for anterior drawer  Right Ankle/Foot   Positive for anterior drawer         Flowsheet Rows      Most Recent Value   PT/OT G-Codes   Current Score  63   Projected Score  78             Precautions: N/A      Manuals 3/30                                                                Neuro Re-Ed                                                                                                        Ther Ex             Bike 10'            Prostretch 5x30s                                                                                           Ther Activity                                       Gait Training                                       Modalities             Ice 10'

## 2021-03-30 NOTE — LETTER
2021    Alie Gaytan MD  [address] [facility] [employer]    Patient: Pipo Tobar   YOB: 2000   Date of Visit: 3/30/2021     Encounter Diagnosis     ICD-10-CM    1  Sprain of anterior talofibular ligament of left ankle, initial encounter  G42 248A        Dear Dr Renee Lujan: Thank you for your recent referral of Pipo Tobar  Please review the attached evaluation summary from Radha's recent visit  Please verify that you agree with the plan of care by signing the attached order  If you have any questions or concerns, please do not hesitate to call  I sincerely appreciate the opportunity to share in the care of one of your patients and hope to have another opportunity to work with you in the near future  Sincerely,    Shira Frank, PT      Referring Provider:      I certify that I have read the below Plan of Care and certify the need for these services furnished under this plan of treatment while under my care  Alie Gaytan MD  [address] [address]  [place] [place]          PT Evaluation     Today's date: 3/30/2021  Patient name: Pipo Tobar  : 2000  MRN: [id number]  Referring provider: Thee Lieberman MD  Dx:   Encounter Diagnosis     ICD-10-CM    1  Sprain of anterior talofibular ligament of left ankle, initial encounter  S93 492A                   Assessment  Assessment details: Pt reports to PT with cc of L ankle pain after injury at work roughly 2 weeks ago  Pt has pain consistent with sprain of ATFL and will benefit from skilled PT in order to improve LE strength and ROM and return to PLOF     Impairments: abnormal coordination, abnormal gait, abnormal muscle firing, abnormal muscle tone, abnormal or restricted ROM, abnormal movement, activity intolerance, impaired physical strength, lacks appropriate home exercise program, pain with function, weight-bearing intolerance, poor posture  and poor body mechanics    Goals  In 4 weeks pt will:  -Be independent with phase I of HEP  -Increase LE strength by 1/2 grade  -Demonstrate full LE ROM    By discharge pt will:  -Be independent with Phase II of HEP  -Demonstrate 5/5 B LE strength  -Report minimal difficulty with ADLs      Plan  Patient would benefit from: skilled physical therapy  Planned modality interventions: cryotherapy, electrical stimulation/Russian stimulation, TENS and thermotherapy: hydrocollator packs  Planned therapy interventions: joint mobilization, manual therapy, motor coordination training, muscle pump exercises, neuromuscular re-education, patient education, strengthening, stretching, body mechanics training, functional ROM exercises, therapeutic exercise, therapeutic activities and home exercise program  Frequency: 2x week  Duration in weeks: 6  Plan of Care beginning date: 3/30/2021  Plan of Care expiration date: 2021  Treatment plan discussed with: PTA and patient        Subjective Evaluation    History of Present Illness  Mechanism of injury: Pt reports to PT with cc of L ankle pain after injury at work roughly 2 weeks ago  Pt has been to urgent care and seen physician  Pain  Current pain ratin  At best pain ratin  At worst pain ratin    Patient Goals  Patient goals for therapy: decreased pain, improved balance, increased motion, increased strength, independence with ADLs/IADLs, return to sport/leisure activities and return to work          Objective     Passive Range of Motion   Left Ankle/Foot    Dorsiflexion (ke): 14 degrees   Dorsiflexion (kf): 16 degrees   Inversion: 25 degrees   Eversion: 20 degrees   Great toe extension: WFL    Strength/Myotome Testing     Left Ankle/Foot   Dorsiflexion: 3+  Plantar flexion: 3+  Inversion: 3+  Eversion: 3+    Right Ankle/Foot   Dorsiflexion: 5  Plantar flexion: 5  Inversion: 5  Eversion: 5    Tests   Left Ankle/Foot   Positive for anterior drawer       Right Ankle/Foot Positive for anterior drawer         Flowsheet Rows      Most Recent Value   PT/OT G-Codes   Current Score  63   Projected Score  78             Precautions: N/A      Manuals 3/30                                                                Neuro Re-Ed                                                                                                        Ther Ex             Bike 10'            Prostretch 5x30s                                                                                           Ther Activity                                       Gait Training                                       Modalities             Ice 10'

## 2021-04-01 ENCOUNTER — OFFICE VISIT (OUTPATIENT)
Dept: PHYSICAL THERAPY | Age: 21
End: 2021-04-01
Payer: OTHER MISCELLANEOUS

## 2021-04-01 DIAGNOSIS — S93.492A SPRAIN OF ANTERIOR TALOFIBULAR LIGAMENT OF LEFT ANKLE, INITIAL ENCOUNTER: Primary | ICD-10-CM

## 2021-04-01 PROCEDURE — 97110 THERAPEUTIC EXERCISES: CPT | Performed by: PHYSICAL THERAPIST

## 2021-04-01 PROCEDURE — 97112 NEUROMUSCULAR REEDUCATION: CPT | Performed by: PHYSICAL THERAPIST

## 2021-04-01 PROCEDURE — 97010 HOT OR COLD PACKS THERAPY: CPT | Performed by: PHYSICAL THERAPIST

## 2021-04-01 PROCEDURE — 97140 MANUAL THERAPY 1/> REGIONS: CPT | Performed by: PHYSICAL THERAPIST

## 2021-04-01 NOTE — PROGRESS NOTES
Daily Note     Today's date: 2021  Patient name: Jaja Bryant  : 2000  MRN: [id number]  Referring provider: Shabana Ochoa MD  Dx:   Encounter Diagnosis     ICD-10-CM    1  Sprain of anterior talofibular ligament of left ankle, initial encounter  S93 492A                   Subjective: Pt reports some soreness after last session, reports minimal pain presently      Objective: See treatment diary below      Assessment: Tolerated treatment well  Patient demonstrated fatigue post treatment, would benefit from continued PT and pt has pain with end-range eversion, likely due to lack of joint space at distal fibula  Plan: Continue per plan of care  Progress treatment as tolerated         Precautions: N/A      Manuals 3/30 4/1           PROM  NEGRON                                                  Neuro Re-Ed             SLS blue foam  EC 3x30s           DL stance dynadisc  EC 3x30s           Biodex LS, RC, WS  LS, RC, WS 1 min ea                                                               Ther Ex             Bike 10' 10'           Prostretch 5x30s  5x30s            Inv/erv  3x12 RTB           Leg press  3x15 50#                                                               Ther Activity                                       Gait Training                                       Modalities             Ice 10' 10'

## 2021-04-05 ENCOUNTER — OFFICE VISIT (OUTPATIENT)
Dept: PHYSICAL THERAPY | Age: 21
End: 2021-04-05
Payer: OTHER MISCELLANEOUS

## 2021-04-05 DIAGNOSIS — S93.492A SPRAIN OF ANTERIOR TALOFIBULAR LIGAMENT OF LEFT ANKLE, INITIAL ENCOUNTER: Primary | ICD-10-CM

## 2021-04-05 PROCEDURE — 97112 NEUROMUSCULAR REEDUCATION: CPT | Performed by: PHYSICAL THERAPIST

## 2021-04-05 PROCEDURE — 97110 THERAPEUTIC EXERCISES: CPT | Performed by: PHYSICAL THERAPIST

## 2021-04-05 PROCEDURE — 97010 HOT OR COLD PACKS THERAPY: CPT | Performed by: PHYSICAL THERAPIST

## 2021-04-05 NOTE — PROGRESS NOTES
Daily Note     Today's date: 2021  Patient name: Vicente Murphy  : 2000  MRN: [id number]  Referring provider: Anton Melgar MD  Dx:   Encounter Diagnosis     ICD-10-CM    1  Sprain of anterior talofibular ligament of left ankle, initial encounter  S93 492A                   Subjective: Pt reports minimal pain over the weekend       Objective: See treatment diary below      Assessment: Tolerated treatment well  Patient demonstrated fatigue post treatment, would benefit from continued PT and pt remains challenged with dynamic balance activities  Plan: Continue per plan of care  Progress treatment as tolerated         Precautions: N/A      Manuals 3/30 4/1 4/5          PROM  NEGRON np                                                 Neuro Re-Ed             SLS blue foam  EC 3x30s EC 3x30s          DL stance dynadisc  EC 3x30s EC 3x30s          Biodex LS, RC, WS  LS, RC, WS 1 min ea LS, RC, WS 1 min ea          Stance on dynadisc with ball throw to trampoline   2x1min                                                 Ther Ex             Bike 10' 10' 10          Prostretch 5x30s  5x30s  5x30s          Inv/erv  3x12 RTB and dorsi 3x15 RTB          Leg press  3x15 50# 3x15 60#          Trampoline-jumping                                                    Ther Activity                                       Gait Training                                       Modalities             Ice 10' 10'

## 2021-04-06 ENCOUNTER — APPOINTMENT (OUTPATIENT)
Dept: URGENT CARE | Age: 21
End: 2021-04-06
Payer: OTHER MISCELLANEOUS

## 2021-04-06 PROCEDURE — 99214 OFFICE O/P EST MOD 30 MIN: CPT | Performed by: PREVENTIVE MEDICINE

## 2021-04-08 ENCOUNTER — OFFICE VISIT (OUTPATIENT)
Dept: PHYSICAL THERAPY | Age: 21
End: 2021-04-08
Payer: OTHER MISCELLANEOUS

## 2021-04-08 DIAGNOSIS — S93.492A SPRAIN OF ANTERIOR TALOFIBULAR LIGAMENT OF LEFT ANKLE, INITIAL ENCOUNTER: Primary | ICD-10-CM

## 2021-04-08 PROCEDURE — 97112 NEUROMUSCULAR REEDUCATION: CPT | Performed by: PHYSICAL THERAPIST

## 2021-04-08 PROCEDURE — 97110 THERAPEUTIC EXERCISES: CPT | Performed by: PHYSICAL THERAPIST

## 2021-04-08 PROCEDURE — 97010 HOT OR COLD PACKS THERAPY: CPT | Performed by: PHYSICAL THERAPIST

## 2021-04-08 PROCEDURE — 97140 MANUAL THERAPY 1/> REGIONS: CPT | Performed by: PHYSICAL THERAPIST

## 2021-04-08 NOTE — PROGRESS NOTES
Daily Note     Today's date: 2021  Patient name: Venkat Pablo  : 2000  MRN: [id number]  Referring provider: Reyes Willoughby MD  Dx:   Encounter Diagnosis     ICD-10-CM    1  Sprain of anterior talofibular ligament of left ankle, initial encounter  S93 492A                   Subjective: Pt reports some soreness in lateral ankle with activity      Objective: See treatment diary below      Assessment: Tolerated treatment well  Patient demonstrated fatigue post treatment, would benefit from continued PT and pt remains challenged with SL activities  Pt's pain with therex has decreased but soreness continues to occur after treatment  Plan: Continue per plan of care  Progress treatment as tolerated         Precautions: N/A      Manuals 3/30 4/1 4/5 4/8         PROM  NEGRON np          Dorsi mobs    NEGRON                                   Neuro Re-Ed             SLS blue foam  EC 3x30s EC 3x30s EC 3x30s         DL stance dynadisc  EC 3x30s EC 3x30s EC 3x30s         Biodex LS, RC, WS  LS, RC, WS 1 min ea LS, RC, WS 1 min ea LS, RC, WS 2x1 min ea         Stance on dynadisc with ball throw to trampoline   2x1min np                                                Ther Ex             Bike 10' 10' 10'          Prostretch 5x30s  5x30s  5x30s 5x30s         Inv/erv  3x12 RTB and dorsi 3x15 RTB and dorsi 3x10 GTB         Leg press  3x15 50# 3x15 60# 40# 3x10 SL         Trampoline-jumping             Star excursion    3x ea                                   Ther Activity                                       Gait Training                                       Modalities             Ice 10' 10'  10'

## 2021-04-12 ENCOUNTER — OFFICE VISIT (OUTPATIENT)
Dept: PHYSICAL THERAPY | Age: 21
End: 2021-04-12
Payer: OTHER MISCELLANEOUS

## 2021-04-12 DIAGNOSIS — S93.492A SPRAIN OF ANTERIOR TALOFIBULAR LIGAMENT OF LEFT ANKLE, INITIAL ENCOUNTER: Primary | ICD-10-CM

## 2021-04-12 PROCEDURE — 97110 THERAPEUTIC EXERCISES: CPT | Performed by: PHYSICAL THERAPIST

## 2021-04-12 PROCEDURE — 97112 NEUROMUSCULAR REEDUCATION: CPT | Performed by: PHYSICAL THERAPIST

## 2021-04-12 PROCEDURE — 97010 HOT OR COLD PACKS THERAPY: CPT | Performed by: PHYSICAL THERAPIST

## 2021-04-12 NOTE — PROGRESS NOTES
Daily Note     Today's date: 2021  Patient name: Renetta Zamora  : 2000  MRN: [id number]  Referring provider: Leon Rodas MD  Dx:   Encounter Diagnosis     ICD-10-CM    1  Sprain of anterior talofibular ligament of left ankle, initial encounter  S93 492A                   Subjective: Pt reports some soreness in lateral ankle after standing for >4 hours sat/sun  Objective: See treatment diary below      Assessment: Tolerated treatment well  Patient demonstrated fatigue post treatment Pt is tolerating progression with therex, will begin more dynamic exercises with goal of returning to PLOF  Plan: Continue per plan of care  Progress treatment as tolerated         Precautions: N/A      Manuals 3/30 4/1 4/5 4/8 4/12        PROM  NEGRON np          Dorsi mobs    NEGRON                                   Neuro Re-Ed             SLS blue foam  EC 3x30s EC 3x30s EC 3x30s         DL stance dynadisc  EC 3x30s EC 3x30s EC 3x30s EC 3x30s        Biodex LS, RC, WS  LS, RC, WS 1 min ea LS, RC, WS 1 min ea LS, RC, WS 2x1 min ea LS, RC, WS 2x1 min ea        Stance on dynadisc with ball throw to trampoline   2x1min np                                                Ther Ex             Bike 10' 10' 10'  10'        Prostretch 5x30s  5x30s  5x30s 5x30s 5x30s        Inv/erv  3x12 RTB and dorsi 3x15 RTB and dorsi 3x10 GTB  3x10 GTB        Leg press  3x15 50# 3x15 60# 40# 3x10 SL 40# 3x10 SL        Trampoline-jumping     5x30s        Star excursion    3x ea 4x ea                                  Ther Activity                                       Gait Training                                       Modalities             Ice 10' 10'  10' 10'

## 2021-04-15 ENCOUNTER — OFFICE VISIT (OUTPATIENT)
Dept: PHYSICAL THERAPY | Age: 21
End: 2021-04-15
Payer: OTHER MISCELLANEOUS

## 2021-04-15 DIAGNOSIS — S93.492A SPRAIN OF ANTERIOR TALOFIBULAR LIGAMENT OF LEFT ANKLE, INITIAL ENCOUNTER: Primary | ICD-10-CM

## 2021-04-15 PROCEDURE — 97112 NEUROMUSCULAR REEDUCATION: CPT | Performed by: PHYSICAL THERAPIST

## 2021-04-15 PROCEDURE — 97110 THERAPEUTIC EXERCISES: CPT | Performed by: PHYSICAL THERAPIST

## 2021-04-15 PROCEDURE — 97010 HOT OR COLD PACKS THERAPY: CPT | Performed by: PHYSICAL THERAPIST

## 2021-04-15 NOTE — PROGRESS NOTES
Daily Note     Today's date: 4/15/2021  Patient name: Ashutosh Rosas  : 2000  MRN: [id number]  Referring provider: Neris Aguirre MD  Dx:   Encounter Diagnosis     ICD-10-CM    1  Sprain of anterior talofibular ligament of left ankle, initial encounter  S93 492A                   Subjective: Pt reports soreness after last session, states it has resolved      Objective: See treatment diary below      Assessment: Tolerated treatment well  Patient demonstrated fatigue post treatment, would benefit from continued PT and pt had pain with SL trampoline jump, but tolerated all new exercises well  Plan: Continue per plan of care  Progress treatment as tolerated         Precautions: N/A      Manuals 3/30 4/1 4/5 4/8 4/12        PROM  NEGRON np          Dorsi mobs    NEGRON                                   Neuro Re-Ed             SLS blue foam  EC 3x30s EC 3x30s EC 3x30s         DL stance dynadisc  EC 3x30s EC 3x30s EC 3x30s EC 3x30s EC 3x30s       Biodex LS, RC, WS  LS, RC, WS 1 min ea LS, RC, WS 1 min ea LS, RC, WS 2x1 min ea LS, RC, WS 2x1 min ea LS, RC, WS 2x1 min ea       Stance on dynadisc with ball throw to trampoline   2x1min np         Step up to blue foam      12in 2x10 ea       Jump to blue foam      8 in 3x5 ea       Walking-foam      5x forward/backward       Ther Ex             Bike 10' 10' 10'  10' 10'       Prostretch 5x30s  5x30s  5x30s 5x30s 5x30s 5x30s       Inv/erv  3x12 RTB and dorsi 3x15 RTB and dorsi 3x10 GTB  3x10 GTB 3x10 BTB       Leg press  3x15 50# 3x15 60# 40# 3x10 SL 40# 3x10 SL        Trampoline-jumping     5x30s 5x30s DL 2x15s SL       Star excursion    3x ea 4x ea 4x ea                                 Ther Activity                                       Gait Training                                       Modalities             Ice 10' 10'  10' 10'

## 2021-04-19 ENCOUNTER — TELEMEDICINE (OUTPATIENT)
Dept: INTERNAL MEDICINE CLINIC | Facility: CLINIC | Age: 21
End: 2021-04-19
Payer: COMMERCIAL

## 2021-04-19 ENCOUNTER — APPOINTMENT (OUTPATIENT)
Dept: PHYSICAL THERAPY | Age: 21
End: 2021-04-19
Payer: OTHER MISCELLANEOUS

## 2021-04-19 VITALS — TEMPERATURE: 96.6 F | HEIGHT: 66 IN | BODY MASS INDEX: 23.3 KG/M2 | WEIGHT: 145 LBS

## 2021-04-19 DIAGNOSIS — Z20.822 EXPOSURE TO COVID-19 VIRUS: Primary | ICD-10-CM

## 2021-04-19 DIAGNOSIS — Z20.822 EXPOSURE TO COVID-19 VIRUS: ICD-10-CM

## 2021-04-19 PROCEDURE — U0005 INFEC AGEN DETEC AMPLI PROBE: HCPCS | Performed by: NURSE PRACTITIONER

## 2021-04-19 PROCEDURE — U0003 INFECTIOUS AGENT DETECTION BY NUCLEIC ACID (DNA OR RNA); SEVERE ACUTE RESPIRATORY SYNDROME CORONAVIRUS 2 (SARS-COV-2) (CORONAVIRUS DISEASE [COVID-19]), AMPLIFIED PROBE TECHNIQUE, MAKING USE OF HIGH THROUGHPUT TECHNOLOGIES AS DESCRIBED BY CMS-2020-01-R: HCPCS | Performed by: NURSE PRACTITIONER

## 2021-04-19 PROCEDURE — 99213 OFFICE O/P EST LOW 20 MIN: CPT | Performed by: NURSE PRACTITIONER

## 2021-04-19 PROCEDURE — 3008F BODY MASS INDEX DOCD: CPT | Performed by: NURSE PRACTITIONER

## 2021-04-19 PROCEDURE — 3725F SCREEN DEPRESSION PERFORMED: CPT | Performed by: NURSE PRACTITIONER

## 2021-04-19 NOTE — PROGRESS NOTES
COVID-19 Outpatient Progress Note    Assessment/Plan:    Problem List Items Addressed This Visit     None      Visit Diagnoses     Exposure to COVID-19 virus    -  Primary    Relevant Orders    Novel Coronavirus (Covid-19),PCR [facility] - Collected at   [facility] or [facility]         Disposition:     I referred patient to one of our centralized sites for a COVID-19 swab  I have spent 15 minutes directly with the patient  Greater than 50% of this time was spent in counseling/coordination of care regarding: instructions for management and impressions  Encounter provider Israel Martinez    Provider located at [address] [address] [address]    Recent Visits  No visits were found meeting these conditions  Showing recent visits within past 7 days and meeting all other requirements     Today's Visits  Date Type Provider Dept   04/19/21 Telemedicine Felix Martinez today's visits and meeting all other requirements     Future Appointments  No visits were found meeting these conditions  Showing future appointments within next 150 days and meeting all other requirements      This virtual check-in was done via [employer] and patient was informed that this is not a secure, HIPAA-compliant platform  She agrees to proceed  Patient agrees to participate in a virtual check in via telephone or video visit instead of presenting to the office to address urgent/immediate medical needs  Patient is aware this is a billable service  After connecting through [facility], the patient was identified by name and date of birth  Gem Butterfield was informed that this was a telemedicine visit and that the exam was being conducted confidentially over secure lines  My office door was closed  No one else was in the room  Gem Butterfield acknowledged consent and understanding of privacy and security of the telemedicine visit   I informed the patient that I have reviewed her record in Epic and presented the opportunity for her to ask any questions regarding the visit today  The patient agreed to participate  Subjective:   Marilu Pascual is a 23 y o  female who is concerned about COVID-19  Patient's symptoms include rhinorrhea, sore throat and vomiting  Patient denies fever, chills, fatigue, malaise, congestion, anosmia, loss of taste, cough, shortness of breath, chest tightness, abdominal pain, nausea, diarrhea, myalgias and headaches  Date of symptom onset: 4/16/2021    Exposure:   Contact with a person who is under investigation (PUI) for or who is positive for COVID-19 within the last 14 days?: No    Hospitalized recently for fever and/or lower respiratory symptoms?: No      Currently a healthcare worker that is involved in direct patient care?: No      Works in a special setting where the risk of COVID-19 transmission may be high? (this may include long-term care, correctional and care home facilities; homeless shelters; assisted-living facilities and group homes ): No      Resident in a special setting where the risk of COVID-19 transmission may be high? (this may include long-term care, correctional and care home facilities; homeless shelters; assisted-living facilities and group homes ): No      No results found for: Elias Ormond, [address] [place] [address], [address] [place] [address],[facility]  [address] [address]  Past Medical History:   Diagnosis Date    Neurologic disorder     Black outs    Varicella      No past surgical history on file  Current Outpatient Medications   Medication Sig Dispense Refill    beclomethasone (QVAR) 80 MCG/ACT inhaler Inhale 2 puffs 2 (two) times a day Rinse mouth after use  1 Inhaler 2    levonorgestrel-ethinyl estradiol (AVIANE,ALESSE,LESSINA) 0 1-20 MG-MCG per tablet Take 1 tablet by mouth daily 84 tablet 4     No current facility-administered medications for this visit        No Known Allergies    Review of Systems   Constitutional: Negative for chills, fatigue and fever  HENT: Positive for rhinorrhea and sore throat  Negative for congestion  Respiratory: Negative for cough, chest tightness and shortness of breath  Gastrointestinal: Positive for vomiting  Negative for abdominal pain, diarrhea and nausea  Musculoskeletal: Negative for myalgias  Neurological: Negative for headaches  Objective:    Vitals:    04/19/21 1002   Temp: (!) 96 6 °F (35 9 °C)   Weight: 65 8 kg (145 lb)   Height: 5' 6" (1 676 m)       Physical Exam  Vitals signs reviewed  Constitutional:       Appearance: Normal appearance  Neurological:      Mental Status: She is alert  VIRTUAL VISIT DISCLAIMER    Venkat Pablo acknowledges that she has consented to an online visit or consultation  She understands that the online visit is based solely on information provided by her, and that, in the absence of a face-to-face physical evaluation by the physician, the diagnosis she receives is both limited and provisional in terms of accuracy and completeness  This is not intended to replace a full medical face-to-face evaluation by the physician  Venkat Pablo understands and accepts these terms

## 2021-04-20 LAB — SARS-COV-2 RNA RESP QL NAA+PROBE: NEGATIVE

## 2021-04-22 ENCOUNTER — APPOINTMENT (OUTPATIENT)
Dept: PHYSICAL THERAPY | Age: 21
End: 2021-04-22
Payer: OTHER MISCELLANEOUS

## 2021-04-26 ENCOUNTER — TRANSCRIBE ORDERS (OUTPATIENT)
Dept: PHYSICAL THERAPY | Age: 21
End: 2021-04-26

## 2021-04-26 DIAGNOSIS — S93.492A SPRAIN OF ANTERIOR TALOFIBULAR LIGAMENT OF LEFT ANKLE, INITIAL ENCOUNTER: Primary | ICD-10-CM

## 2021-04-27 ENCOUNTER — APPOINTMENT (OUTPATIENT)
Dept: PHYSICAL THERAPY | Age: 21
End: 2021-04-27
Payer: OTHER MISCELLANEOUS

## 2021-05-14 ENCOUNTER — APPOINTMENT (OUTPATIENT)
Dept: URGENT CARE | Age: 21
End: 2021-05-14
Payer: OTHER MISCELLANEOUS

## 2021-05-14 PROCEDURE — 99213 OFFICE O/P EST LOW 20 MIN: CPT | Performed by: PHYSICIAN ASSISTANT

## 2021-07-01 ENCOUNTER — HOSPITAL ENCOUNTER (EMERGENCY)
Facility: HOSPITAL | Age: 21
Discharge: HOME | End: 2021-07-01
Attending: EMERGENCY MEDICINE
Payer: COMMERCIAL

## 2021-07-01 VITALS
RESPIRATION RATE: 18 BRPM | OXYGEN SATURATION: 99 % | BODY MASS INDEX: 25.34 KG/M2 | WEIGHT: 157 LBS | DIASTOLIC BLOOD PRESSURE: 64 MMHG | HEART RATE: 72 BPM | TEMPERATURE: 97.9 F | SYSTOLIC BLOOD PRESSURE: 127 MMHG

## 2021-07-01 DIAGNOSIS — T78.40XA ACUTE ALLERGIC REACTION, INITIAL ENCOUNTER: Primary | ICD-10-CM

## 2021-07-01 LAB
ALBUMIN SERPL BCP-MCNC: 4.2 G/DL (ref 3.5–5)
ALP SERPL-CCNC: 28 U/L (ref 46–116)
ALT SERPL W P-5'-P-CCNC: 32 U/L (ref 12–78)
ANION GAP SERPL CALCULATED.3IONS-SCNC: 14 MMOL/L (ref 4–13)
APTT PPP: 26 SECONDS (ref 23–37)
AST SERPL W P-5'-P-CCNC: 21 U/L (ref 5–45)
BASOPHILS # BLD AUTO: 0.04 THOUSANDS/ΜL (ref 0–0.1)
BASOPHILS NFR BLD AUTO: 0 % (ref 0–1)
BILIRUB SERPL-MCNC: 0.4 MG/DL (ref 0.2–1)
BUN SERPL-MCNC: 12 MG/DL (ref 5–25)
CALCIUM SERPL-MCNC: 9.3 MG/DL (ref 8.3–10.1)
CHLORIDE SERPL-SCNC: 105 MMOL/L (ref 100–108)
CO2 SERPL-SCNC: 25 MMOL/L (ref 21–32)
CREAT SERPL-MCNC: 0.78 MG/DL (ref 0.6–1.3)
EOSINOPHIL # BLD AUTO: 0.29 THOUSAND/ΜL (ref 0–0.61)
EOSINOPHIL NFR BLD AUTO: 3 % (ref 0–6)
ERYTHROCYTE [DISTWIDTH] IN BLOOD BY AUTOMATED COUNT: 12.2 % (ref 11.6–15.1)
GFR SERPL CREATININE-BSD FRML MDRD: 108 ML/MIN/1.73SQ M
GLUCOSE SERPL-MCNC: 87 MG/DL (ref 65–140)
HCT VFR BLD AUTO: 44.9 % (ref 34.8–46.1)
HGB BLD-MCNC: 15.4 G/DL (ref 11.5–15.4)
IMM GRANULOCYTES # BLD AUTO: 0.03 THOUSAND/UL (ref 0–0.2)
IMM GRANULOCYTES NFR BLD AUTO: 0 % (ref 0–2)
INR PPP: 0.97 (ref 0.84–1.19)
LYMPHOCYTES # BLD AUTO: 3.89 THOUSANDS/ΜL (ref 0.6–4.47)
LYMPHOCYTES NFR BLD AUTO: 44 % (ref 14–44)
MAGNESIUM SERPL-MCNC: 2.1 MG/DL (ref 1.6–2.6)
MCH RBC QN AUTO: 32.8 PG (ref 26.8–34.3)
MCHC RBC AUTO-ENTMCNC: 34.3 G/DL (ref 31.4–37.4)
MCV RBC AUTO: 96 FL (ref 82–98)
MONOCYTES # BLD AUTO: 0.71 THOUSAND/ΜL (ref 0.17–1.22)
MONOCYTES NFR BLD AUTO: 8 % (ref 4–12)
NEUTROPHILS # BLD AUTO: 3.93 THOUSANDS/ΜL (ref 1.85–7.62)
NEUTS SEG NFR BLD AUTO: 45 % (ref 43–75)
NRBC BLD AUTO-RTO: 0 /100 WBCS
PLATELET # BLD AUTO: 368 THOUSANDS/UL (ref 149–390)
PMV BLD AUTO: 10.1 FL (ref 8.9–12.7)
POTASSIUM SERPL-SCNC: 3.7 MMOL/L (ref 3.5–5.3)
PROT SERPL-MCNC: 7.9 G/DL (ref 6.4–8.2)
PROTHROMBIN TIME: 13 SECONDS (ref 11.6–14.5)
RBC # BLD AUTO: 4.7 MILLION/UL (ref 3.81–5.12)
SODIUM SERPL-SCNC: 144 MMOL/L (ref 136–145)
WBC # BLD AUTO: 8.89 THOUSAND/UL (ref 4.31–10.16)

## 2021-07-01 PROCEDURE — 85730 THROMBOPLASTIN TIME PARTIAL: CPT | Performed by: PHYSICIAN ASSISTANT

## 2021-07-01 PROCEDURE — 80053 COMPREHEN METABOLIC PANEL: CPT | Performed by: PHYSICIAN ASSISTANT

## 2021-07-01 PROCEDURE — 99283 EMERGENCY DEPT VISIT LOW MDM: CPT

## 2021-07-01 PROCEDURE — 96375 TX/PRO/DX INJ NEW DRUG ADDON: CPT

## 2021-07-01 PROCEDURE — 85025 COMPLETE CBC W/AUTO DIFF WBC: CPT | Performed by: PHYSICIAN ASSISTANT

## 2021-07-01 PROCEDURE — 85610 PROTHROMBIN TIME: CPT | Performed by: PHYSICIAN ASSISTANT

## 2021-07-01 PROCEDURE — 36415 COLL VENOUS BLD VENIPUNCTURE: CPT | Performed by: PHYSICIAN ASSISTANT

## 2021-07-01 PROCEDURE — 83735 ASSAY OF MAGNESIUM: CPT | Performed by: PHYSICIAN ASSISTANT

## 2021-07-01 PROCEDURE — 96374 THER/PROPH/DIAG INJ IV PUSH: CPT

## 2021-07-01 PROCEDURE — 96361 HYDRATE IV INFUSION ADD-ON: CPT

## 2021-07-01 PROCEDURE — 99284 EMERGENCY DEPT VISIT MOD MDM: CPT | Performed by: PHYSICIAN ASSISTANT

## 2021-07-01 RX ORDER — DIPHENHYDRAMINE HYDROCHLORIDE 50 MG/ML
25 INJECTION INTRAMUSCULAR; INTRAVENOUS ONCE
Status: COMPLETED | OUTPATIENT
Start: 2021-07-01 | End: 2021-07-01

## 2021-07-01 RX ORDER — DEXAMETHASONE SODIUM PHOSPHATE 4 MG/ML
10 INJECTION, SOLUTION INTRA-ARTICULAR; INTRALESIONAL; INTRAMUSCULAR; INTRAVENOUS; SOFT TISSUE ONCE
Status: COMPLETED | OUTPATIENT
Start: 2021-07-01 | End: 2021-07-01

## 2021-07-01 RX ORDER — DIPHENHYDRAMINE HCL 25 MG
25 TABLET ORAL EVERY 6 HOURS PRN
Qty: 8 TABLET | Refills: 0 | Status: SHIPPED | OUTPATIENT
Start: 2021-07-01 | End: 2021-11-12

## 2021-07-01 RX ADMIN — FAMOTIDINE 20 MG: 10 INJECTION, SOLUTION INTRAVENOUS at 20:14

## 2021-07-01 RX ADMIN — SODIUM CHLORIDE 500 ML: 0.9 INJECTION, SOLUTION INTRAVENOUS at 20:42

## 2021-07-01 RX ADMIN — DEXAMETHASONE SODIUM PHOSPHATE 10 MG: 4 INJECTION INTRA-ARTICULAR; INTRALESIONAL; INTRAMUSCULAR; INTRAVENOUS; SOFT TISSUE at 20:14

## 2021-07-01 RX ADMIN — DIPHENHYDRAMINE HYDROCHLORIDE 25 MG: 50 INJECTION, SOLUTION INTRAMUSCULAR; INTRAVENOUS at 20:14

## 2021-07-02 NOTE — DISCHARGE INSTRUCTIONS
Take Benadryl as indicated  Follow-up with allergist  Follow-up with PCP  Follow up emergency department symptoms persist or exacerbate

## 2021-07-02 NOTE — ED PROVIDER NOTES
History  Chief Complaint   Patient presents with    Allergic Reaction     Pt reports generalized rash today with itchiness and heat  Reports chin and forehead edema  Reports feels "like theirs hives in my throat "  Ate an apple with peanut butter at 5pm     Patient is a 18-year-old female with no significant past medical surgical history that presents emergency department with improved generalized red rash for 3 hours  Patient denies associated symptomatology  Patient states that while she was at gymnastics practice she had eaten a apples and peanut butter neck snack from [employer]", and noticed approximately 10-20 minutes status post item consumption, she noticed red itchy rash over her generalized body  Patient denies history new medications  Patient denies history of allergies  Patient denies palliative and provocative factors  Patient denies not effective treatment  Patient denies fevers, chills, nausea, vomiting, diarrhea, constipation, and urinary symptoms  Patient denies recent fall or recent trauma  Patient denies sick contacts or recent travel  Patient denies chest pain, shortness breath, and abdominal pain  History provided by:  Patient   used: No        Prior to Admission Medications   Prescriptions Last Dose Informant Patient Reported? Taking? beclomethasone (QVAR) 80 MCG/ACT inhaler  Self No No   Sig: Inhale 2 puffs 2 (two) times a day Rinse mouth after use  levonorgestrel-ethinyl estradiol (AVIANE,ALESSE,LESSINA) 0 1-20 MG-MCG per tablet  Self No No   Sig: Take 1 tablet by mouth daily      Facility-Administered Medications: None       Past Medical History:   Diagnosis Date    Neurologic disorder     Black outs    Varicella        History reviewed  No pertinent surgical history      Family History   Problem Relation Age of Onset    Cancer Maternal Grandmother     Arthritis Family     Osteoarthritis Family     Hypertension Family     Varicose Veins Family lower extremities    Stroke Other     Multiple sclerosis Mother     Stroke Maternal Grandfather         At least 2    Kidney disease Maternal Grandfather     Uterine cancer Maternal Aunt      I have reviewed and agree with the history as documented  E-Cigarette/Vaping    E-Cigarette Use Current Every Day User      E-Cigarette/Vaping Substances    Nicotine Yes     THC No     CBD No     Flavoring Yes     Other No     Unknown No      Social History     Tobacco Use    Smoking status: Never Smoker    Smokeless tobacco: Never Used   Vaping Use    Vaping Use: Every day    Substances: Nicotine, Flavoring   Substance Use Topics    Alcohol use: Yes     Alcohol/week: 1 0 - 3 0 standard drinks     Types: 1 - 3 Glasses of wine per week    Drug use: Yes     Types: Marijuana       Review of Systems   Constitutional: Negative for activity change, appetite change, chills and fever  HENT: Negative for congestion, postnasal drip, rhinorrhea, sinus pressure, sinus pain, sore throat and tinnitus  Eyes: Negative for photophobia and visual disturbance  Respiratory: Negative for cough, chest tightness and shortness of breath  Cardiovascular: Negative for chest pain and palpitations  Gastrointestinal: Negative for abdominal pain, constipation, diarrhea, nausea and vomiting  Genitourinary: Negative for difficulty urinating, dysuria, flank pain, frequency and urgency  Musculoskeletal: Negative for back pain, gait problem, neck pain and neck stiffness  Skin: Positive for rash  Negative for pallor  Allergic/Immunologic: Negative for environmental allergies and food allergies  Neurological: Negative for dizziness, weakness, numbness and headaches  Psychiatric/Behavioral: Negative for confusion  All other systems reviewed and are negative  Physical Exam  Physical Exam  Vitals and nursing note reviewed  Constitutional:       General: She is awake  Appearance: Normal appearance   She is well-developed  She is not ill-appearing, toxic-appearing or diaphoretic  Comments: /64   Pulse 72   Temp 97 9 °F (36 6 °C) (Oral)   Resp 18   Wt 71 2 kg (157 lb)   SpO2 99%   BMI 25 34 kg/m²      HENT:      Head: Normocephalic and atraumatic  Right Ear: Hearing and external ear normal  No decreased hearing noted  No drainage, swelling or tenderness  No mastoid tenderness  Left Ear: Hearing and external ear normal  No decreased hearing noted  No drainage, swelling or tenderness  No mastoid tenderness  Nose: Nose normal       Mouth/Throat:      Lips: Pink  Mouth: Mucous membranes are moist       Pharynx: Oropharynx is clear  Uvula midline  Eyes:      General: Lids are normal  Vision grossly intact  Right eye: No discharge  Left eye: No discharge  Extraocular Movements: Extraocular movements intact  Conjunctiva/sclera: Conjunctivae normal       Pupils: Pupils are equal, round, and reactive to light  Neck:      Vascular: No JVD  Trachea: Trachea and phonation normal  No tracheal tenderness or tracheal deviation  Cardiovascular:      Rate and Rhythm: Normal rate and regular rhythm  Pulses: Normal pulses  Radial pulses are 2+ on the right side and 2+ on the left side  Posterior tibial pulses are 2+ on the right side and 2+ on the left side  Heart sounds: Normal heart sounds  Pulmonary:      Effort: Pulmonary effort is normal       Breath sounds: Normal breath sounds  No stridor  No decreased breath sounds, wheezing, rhonchi or rales  Chest:      Chest wall: No tenderness  Abdominal:      General: Abdomen is flat  Bowel sounds are normal  There is no distension  Palpations: Abdomen is soft  Abdomen is not rigid  Tenderness: There is no abdominal tenderness  There is no guarding or rebound  Musculoskeletal:         General: Normal range of motion        Cervical back: Full passive range of motion without pain, normal range of motion and neck supple  No rigidity  No spinous process tenderness or muscular tenderness  Normal range of motion  Lymphadenopathy:      Head:      Right side of head: No submental, submandibular, tonsillar, preauricular, posterior auricular or occipital adenopathy  Left side of head: No submental, submandibular, tonsillar, preauricular, posterior auricular or occipital adenopathy  Cervical: No cervical adenopathy  Right cervical: No superficial, deep or posterior cervical adenopathy  Left cervical: No superficial, deep or posterior cervical adenopathy  Skin:     General: Skin is warm  Capillary Refill: Capillary refill takes less than 2 seconds  Findings: Rash present  Neurological:      General: No focal deficit present  Mental Status: She is alert and oriented to person, place, and time  GCS: GCS eye subscore is 4  GCS verbal subscore is 5  GCS motor subscore is 6  Sensory: No sensory deficit  Deep Tendon Reflexes: Reflexes are normal and symmetric  Reflex Scores:       Patellar reflexes are 2+ on the right side and 2+ on the left side  Psychiatric:         Mood and Affect: Mood normal          Speech: Speech normal          Behavior: Behavior normal  Behavior is cooperative  Thought Content:  Thought content normal          Judgment: Judgment normal          Vital Signs  ED Triage Vitals [07/01/21 1934]   Temperature Pulse Respirations Blood Pressure SpO2   97 9 °F (36 6 °C) 72 18 127/64 99 %      Temp Source Heart Rate Source Patient Position - Orthostatic VS BP Location FiO2 (%)   Oral Monitor -- -- --      Pain Score       --           Vitals:    07/01/21 1934   BP: 127/64   Pulse: 72         Visual Acuity      ED Medications  Medications   sodium chloride 0 9 % bolus 500 mL (0 mL Intravenous Stopped 7/1/21 2145)   diphenhydrAMINE (BENADRYL) injection 25 mg (25 mg Intravenous Given 7/1/21 2014)   famotidine (PEPCID) injection 20 mg (20 mg Intravenous Given 7/1/21 2014)   dexamethasone (DECADRON) injection 10 mg (10 mg Intravenous Given 7/1/21 2014)       Diagnostic Studies  Results Reviewed     Procedure Component Value Units Date/Time    Comprehensive metabolic panel [[id number]]  (Abnormal) Collected: 07/01/21 2015    Lab Status: Final result Specimen: Blood from Arm, Right Updated: 07/01/21 2119     Sodium 144 mmol/L      Potassium 3 7 mmol/L      Chloride 105 mmol/L      CO2 25 mmol/L      ANION GAP 14 mmol/L      BUN 12 mg/dL      Creatinine 0 78 mg/dL      Glucose 87 mg/dL      Calcium 9 3 mg/dL      AST 21 U/L      ALT 32 U/L      Alkaline Phosphatase 28 U/L      Total Protein 7 9 g/dL      Albumin 4 2 g/dL      Total Bilirubin 0 40 mg/dL      eGFR 108 ml/min/1 73sq m     Narrative:      [place] guidelines for Chronic Kidney Disease (CKD):     Stage 1 with normal or high GFR (GFR > 90 mL/min/1 73 square meters)    Stage 2 Mild CKD (GFR = 60-89 mL/min/1 73 square meters)    Stage 3A Moderate CKD (GFR = 45-59 mL/min/1 73 square meters)    Stage 3B Moderate CKD (GFR = 30-44 mL/min/1 73 square meters)    Stage 4 Severe CKD (GFR = 15-29 mL/min/1 73 square meters)    Stage 5 End Stage CKD (GFR <15 mL/min/1 73 square meters)  Note: GFR calculation is accurate only with a steady state creatinine    Magnesium [[id number]]  (Normal) Collected: 07/01/21 2015    Lab Status: Final result Specimen: Blood from Arm, Right Updated: 07/01/21 2104     Magnesium 2 1 mg/dL     Protime-INR [[id number]]  (Normal) Collected: 07/01/21 2015    Lab Status: Final result Specimen: Blood from Arm, Right Updated: 07/01/21 2101     Protime 13 0 seconds      INR 0 97    APTT [[id number]]  (Normal) Collected: 07/01/21 2015    Lab Status: Final result Specimen: Blood from Arm, Right Updated: 07/01/21 2101     PTT 26 seconds     CBC and differential [[id number]] Collected: 07/01/21 2015    Lab Status: Final result Specimen: Blood from Arm, Right Updated: 07/01/21 2048     WBC 8 89 Thousand/uL      RBC 4 70 Million/uL      Hemoglobin 15 4 g/dL      Hematocrit 44 9 %      MCV 96 fL      MCH 32 8 pg      MCHC 34 3 g/dL      RDW 12 2 %      MPV 10 1 fL      Platelets 508 Thousands/uL      nRBC 0 /100 WBCs      Neutrophils Relative 45 %      Immat GRANS % 0 %      Lymphocytes Relative 44 %      Monocytes Relative 8 %      Eosinophils Relative 3 %      Basophils Relative 0 %      Neutrophils Absolute 3 93 Thousands/µL      Immature Grans Absolute 0 03 Thousand/uL      Lymphocytes Absolute 3 89 Thousands/µL      Monocytes Absolute 0 71 Thousand/µL      Eosinophils Absolute 0 29 Thousand/µL      Basophils Absolute 0 04 Thousands/µL                  No orders to display              Procedures  Procedures         ED Course                             SBIRT 20yo+      Most Recent Value   SBIRT (22 yo +)   In order to provide better care to our patients, we are screening all of our patients for alcohol and drug use  Would it be okay to ask you these screening questions? Unable to answer at this time Filed at: 07/01/2021 1949                    MDM  Number of Diagnoses or Management Options  Acute allergic reaction, initial encounter: new and does not require workup     Amount and/or Complexity of Data Reviewed  Clinical lab tests: ordered and reviewed  Review and summarize past medical records: yes    Risk of Complications, Morbidity, and/or Mortality  Presenting problems: low  Diagnostic procedures: low  Management options: low    Patient Progress  Patient progress: stable    Patient is a 53-year-old female with no significant past medical surgical history that presents emergency department with improved generalized red rash for 3 hours  Patient denies associated symptomatology    Patient states that while she was at gymnastics practice she had eaten a apples and peanut butter neck snack from [employer]", and noticed approximately 10-20 minutes status post item consumption, she noticed red itchy rash over her generalized body  Patient denies history new medications  Patient hemodynamically stable and afebrile  Normal liver enzymes, normal PT INR  Normal electrolytes  Delivered Decadron, Benadryl, and Pepcid with patient verbalized decrease in red rash in pruritic symptomatology status post medication delivery; at time my re-evaluation patient had complete abatement of presenting ED symptomatology  Prescribed Benadryl and counseled patient medication administration and side effects  Follow-up with allergist  Follow-up with PCP  Follow up emergency department symptoms persist or exacerbate  Patient demonstrates verbal understanding all clinical laboratory findings, discharge instructions, follow-up verbalized agreement patient current treatment plan  Disposition  Final diagnoses:   Acute allergic reaction, initial encounter     Time reflects when diagnosis was documented in both MDM as applicable and the Disposition within this note     Time User Action Codes Description Comment    7/1/2021 10:35 PM Noé Michelle Add [T78 [id number]] Acute allergic reaction, initial encounter       ED Disposition     ED Disposition Condition Date/Time Comment    Discharge Stable Thu Jul 1, 2021 10:34 PM [address] [facility] discharge to home/self care              Follow-up Information     Follow up With Specialties Details Why Contact Info Additional [address] [facility] Internal Medicine   [address]  [facility]        [address] Emergency Department Emergency Medicine   [address] Emergency Department,  [address], [place], [place], [address]    Formerly named [facility]   [address] [facility] [address] [facility]  [phone] Formerly named [facility] [place], [address] [facility] [place] [address], [place], [place], [address], [phone]          Discharge Medication List as of 7/1/2021 10:36 PM      START taking these medications    Details   diphenhydrAMINE (BENADRYL) 25 mg tablet Take 1 tablet (25 mg total) by mouth every 6 (six) hours as needed for itching for up to 2 days, Starting Thu 7/1/2021, Until Sat 7/3/2021 at 2359, Normal         CONTINUE these medications which have NOT CHANGED    Details   beclomethasone (QVAR) 80 MCG/ACT inhaler Inhale 2 puffs 2 (two) times a day Rinse mouth after use , Starting Fri 8/24/2018, Normal      levonorgestrel-ethinyl estradiol (AVIANE,CLAUDINE,LESSINA) 0 1-20 MG-MCG per tablet Take 1 tablet by mouth daily, Starting Tue 1/26/2021, Normal           No discharge procedures on file      PDMP Review     None          ED Provider  Electronically Signed by           Daniel Madrigal PA-C  07/02/21 0254

## 2021-07-09 ENCOUNTER — TELEMEDICINE (OUTPATIENT)
Dept: INTERNAL MEDICINE CLINIC | Facility: CLINIC | Age: 21
End: 2021-07-09
Payer: COMMERCIAL

## 2021-07-09 ENCOUNTER — CLINICAL SUPPORT (OUTPATIENT)
Dept: INTERNAL MEDICINE CLINIC | Facility: CLINIC | Age: 21
End: 2021-07-09
Payer: COMMERCIAL

## 2021-07-09 VITALS — BODY MASS INDEX: 24.11 KG/M2 | HEIGHT: 66 IN | WEIGHT: 150 LBS

## 2021-07-09 DIAGNOSIS — U07.1 COVID-19 VIRUS INFECTION: Primary | ICD-10-CM

## 2021-07-09 DIAGNOSIS — Z20.822 EXPOSURE TO COVID-19 VIRUS: Primary | ICD-10-CM

## 2021-07-09 PROCEDURE — G2012 BRIEF CHECK IN BY MD/QHP: HCPCS | Performed by: NURSE PRACTITIONER

## 2021-07-09 PROCEDURE — U0005 INFEC AGEN DETEC AMPLI PROBE: HCPCS | Performed by: NURSE PRACTITIONER

## 2021-07-09 PROCEDURE — 3008F BODY MASS INDEX DOCD: CPT | Performed by: NURSE PRACTITIONER

## 2021-07-09 PROCEDURE — U0003 INFECTIOUS AGENT DETECTION BY NUCLEIC ACID (DNA OR RNA); SEVERE ACUTE RESPIRATORY SYNDROME CORONAVIRUS 2 (SARS-COV-2) (CORONAVIRUS DISEASE [COVID-19]), AMPLIFIED PROBE TECHNIQUE, MAKING USE OF HIGH THROUGHPUT TECHNOLOGIES AS DESCRIBED BY CMS-2020-01-R: HCPCS | Performed by: NURSE PRACTITIONER

## 2021-07-09 NOTE — PROGRESS NOTES
COVID-19 Outpatient Progress Note    Assessment/Plan:    Problem List Items Addressed This Visit     None      Visit Diagnoses     Exposure to COVID-19 virus    -  Primary    Relevant Orders    Novel Coronavirus (Covid-19),PCR [facility] - Collected in Office         Disposition:     I recommended the patient to come to our office to perform PCR testing for COVID-19  I have spent 15 minutes directly with the patient  Greater than 50% of this time was spent in counseling/coordination of care regarding: prognosis, instructions for management and impressions  Encounter provider Fidelia Ortega [facility]    Provider located at [address] [place] [id number]    Recent Visits  No visits were found meeting these conditions  Showing recent visits within past 7 days and meeting all other requirements  Today's Visits  Date Type Provider Dept   07/09/21 Telemedicine Felix Ortega today's visits and meeting all other requirements  Future Appointments  No visits were found meeting these conditions  Showing future appointments within next 150 days and meeting all other requirements       Patient agrees to participate in a virtual check in via telephone or video visit instead of presenting to the office to address urgent/immediate medical needs  Patient is aware this is a billable service  After connecting through Telephone, the patient was identified by name and date of birth  Rocael Rain was informed that this was a telemedicine visit and that the exam was being conducted confidentially over secure lines  My office door was closed  No one else was in the room  Rocael Rain acknowledged consent and understanding of privacy and security of the telemedicine visit  I informed the patient that I have reviewed her record in Epic and presented the opportunity for her to ask any questions regarding the visit today   The patient agreed to participate  It was my intent to perform this visit via video technology but the patient was not able to do a video connection so the visit was completed via audio telephone only  Subjective:   Shawn Mike is a 23 y o  female who is concerned about COVID-19  Patient's symptoms include sore throat  Patient denies fever, chills, fatigue, malaise, congestion, rhinorrhea, anosmia, loss of taste, cough, shortness of breath, chest tightness, abdominal pain, nausea, vomiting, diarrhea, myalgias and headaches  Date of symptom onset: 7/7/2021    Exposure:   Contact with a person who is under investigation (PUI) for or who is positive for COVID-19 within the last 14 days?: Yes    Hospitalized recently for fever and/or lower respiratory symptoms?: No      Currently a healthcare worker that is involved in direct patient care?: No      Works in a special setting where the risk of COVID-19 transmission may be high? (this may include long-term care, correctional and [facility] facilities; homeless shelters; assisted-living facilities and group homes ): No      Resident in a special setting where the risk of COVID-19 transmission may be high? (this may include long-term care, correctional and [facility] facilities; homeless shelters; assisted-living facilities and group homes ): No      Lab Results   Component Value Date    SARSCOV2 Negative 04/19/2021     Past Medical History:   Diagnosis Date    Neurologic disorder     Black outs    Varicella      No past surgical history on file  Current Outpatient Medications   Medication Sig Dispense Refill    beclomethasone (QVAR) 80 MCG/ACT inhaler Inhale 2 puffs 2 (two) times a day Rinse mouth after use   1 Inhaler 2    levonorgestrel-ethinyl estradiol (AVIANE,ALESSE,LESSINA) 0 1-20 MG-MCG per tablet Take 1 tablet by mouth daily 84 tablet 4    Pseudoephedrine-APAP-DM (DAYQUIL PO) Take 1 tablet by mouth      diphenhydrAMINE (BENADRYL) 25 mg tablet Take 1 tablet (25 mg total) by mouth every 6 (six) hours as needed for itching for up to 2 days (Patient not taking: Reported on 7/9/2021) 8 tablet 0     No current facility-administered medications for this visit  No Known Allergies    Review of Systems   Constitutional: Negative for chills, fatigue and fever  HENT: Positive for sore throat  Negative for congestion and rhinorrhea  Respiratory: Negative for cough, chest tightness and shortness of breath  Gastrointestinal: Negative for abdominal pain, diarrhea, nausea and vomiting  Musculoskeletal: Negative for myalgias  Neurological: Negative for headaches  Objective:    Vitals:    07/09/21 0838   Weight: 68 kg (150 lb)   Height: 5' 6" (1 676 m)     VIRTUAL VISIT DISCLAIMER    Blake Devi acknowledges that she has consented to an online visit or consultation  She understands that the online visit is based solely on information provided by her, and that, in the absence of a face-to-face physical evaluation by the physician, the diagnosis she receives is both limited and provisional in terms of accuracy and completeness  This is not intended to replace a full medical face-to-face evaluation by the physician  Blake Devi understands and accepts these terms

## 2021-07-10 LAB — SARS-COV-2 RNA RESP QL NAA+PROBE: NEGATIVE

## 2021-07-12 ENCOUNTER — TELEMEDICINE (OUTPATIENT)
Dept: INTERNAL MEDICINE CLINIC | Facility: CLINIC | Age: 21
End: 2021-07-12
Payer: COMMERCIAL

## 2021-07-12 DIAGNOSIS — Z20.822 EXPOSURE TO COVID-19 VIRUS: Primary | ICD-10-CM

## 2021-07-12 PROCEDURE — 1036F TOBACCO NON-USER: CPT | Performed by: NURSE PRACTITIONER

## 2021-07-12 PROCEDURE — 99212 OFFICE O/P EST SF 10 MIN: CPT | Performed by: NURSE PRACTITIONER

## 2021-07-12 NOTE — LETTER
July 12, 2021     Patient: Davi Castano   YOB: 2000   Date of Visit: 7/12/2021       To Whom it May Concern:    Suzymargie Lora is under my professional care  She was seen on 7/12/2021  She needs to quarantine for the rest of the week due to symptoms  She may return to work on 7/19/21             Sincerely,          MIRNA Martin        CC: No Recipients

## 2021-07-12 NOTE — PROGRESS NOTES
COVID-19 Outpatient Progress Note    Assessment/Plan:    Problem List Items Addressed This Visit     None      Visit Diagnoses     Exposure to COVID-19 virus    -  Primary         Disposition:     I recommended continued isolation until at least 24 hours have passed since recovery defined as resolution of fever without the use of fever-reducing medications AND improvement in COVID symptoms AND 10 days have passed since onset of symptoms (or 10 days have passed since date of first positive viral diagnostic test for asymptomatic patients)  I have spent 15 minutes directly with the patient  Greater than 50% of this time was spent in counseling/coordination of care regarding: prognosis, instructions for management and impressions  May return to work on [address] [place] [address] provider Pipe Soto, [facility]    Provider located at [address] [address] [id number]    Recent Visits  Date Type Provider Dept   07/09/21 Telemedicine Felix Aparicio recent visits within past 7 days and meeting all other requirements  Today's Visits  Date Type Provider Dept   07/12/21 Telemedicine Felix Aparicio today's visits and meeting all other requirements  Future Appointments  No visits were found meeting these conditions  Showing future appointments within next 150 days and meeting all other requirements     This virtual check-in was done via [employer] and patient was informed that this is a secure, HIPAA-compliant platform  She agrees to proceed  Patient agrees to participate in a virtual check in via telephone or video visit instead of presenting to the office to address urgent/immediate medical needs  Patient is aware this is a billable service  After connecting through [facility], the patient was identified by name and date of birth   Luz Smith was informed that this was a telemedicine visit and that the exam was being conducted confidentially over secure lines  My office door was closed  No one else was in the room  Verito Collins acknowledged consent and understanding of privacy and security of the telemedicine visit  I informed the patient that I have reviewed her record in Epic and presented the opportunity for her to ask any questions regarding the visit today  The patient agreed to participate  Subjective:   Verito Collins is a 23 y o  female who has been screened for COVID-19  Patient's symptoms include nasal congestion, sore throat, cough and headache  Patient denies fever, chills, fatigue, malaise, rhinorrhea, anosmia, loss of taste, shortness of breath, chest tightness, abdominal pain, nausea, vomiting, diarrhea and myalgias  Date of symptom onset: 7/9/2021    Exposure:   Contact with a person who is under investigation (PUI) for or who is positive for COVID-19 within the last 14 days?: Yes    Hospitalized recently for fever and/or lower respiratory symptoms?: No      Currently a healthcare worker that is involved in direct patient care?: No      Works in a special setting where the risk of COVID-19 transmission may be high? (this may include long-term care, correctional and alf facilities; homeless shelters; assisted-living facilities and group homes ): No      Resident in a special setting where the risk of COVID-19 transmission may be high? (this may include long-term care, correctional and alf facilities; homeless shelters; assisted-living facilities and group homes ): No      Lab Results   Component Value Date    SARSCOV2 Negative 07/09/2021     Past Medical History:   Diagnosis Date    Neurologic disorder     Black outs    Varicella      No past surgical history on file  Current Outpatient Medications   Medication Sig Dispense Refill    beclomethasone (QVAR) 80 MCG/ACT inhaler Inhale 2 puffs 2 (two) times a day Rinse mouth after use   1 Inhaler 2    diphenhydrAMINE (BENADRYL) 25 mg tablet Take 1 tablet (25 mg total) by mouth every 6 (six) hours as needed for itching for up to 2 days (Patient not taking: Reported on 7/9/2021) 8 tablet 0    levonorgestrel-ethinyl estradiol (AVIANE,ALESSE,LESSINA) 0 1-20 MG-MCG per tablet Take 1 tablet by mouth daily 84 tablet 4    Pseudoephedrine-APAP-DM (DAYQUIL PO) Take 1 tablet by mouth       No current facility-administered medications for this visit  No Known Allergies    Review of Systems   Constitutional: Negative for chills, fatigue and fever  HENT: Positive for congestion and sore throat  Negative for rhinorrhea  Respiratory: Positive for cough  Negative for chest tightness and shortness of breath  Gastrointestinal: Negative for abdominal pain, diarrhea, nausea and vomiting  Musculoskeletal: Negative for myalgias  Neurological: Positive for headaches  Objective: There were no vitals filed for this visit  Physical Exam  Vitals reviewed  Constitutional:       Appearance: Normal appearance  Neurological:      Mental Status: She is alert  VIRTUAL VISIT DISCLAIMER    Glenna Stern acknowledges that she has consented to an online visit or consultation  She understands that the online visit is based solely on information provided by her, and that, in the absence of a face-to-face physical evaluation by the physician, the diagnosis she receives is both limited and provisional in terms of accuracy and completeness  This is not intended to replace a full medical face-to-face evaluation by the physician  Glenna Stern understands and accepts these terms

## 2021-07-16 ENCOUNTER — TELEPHONE (OUTPATIENT)
Dept: INTERNAL MEDICINE CLINIC | Facility: CLINIC | Age: 21
End: 2021-07-16

## 2021-07-16 NOTE — LETTER
July 19, 2021     Patient: Shankar Ruiz   YOB: 2000           To Whom it May Concern:    Khushbu Roman is under my professional care  She needs to quarantine at home due to symptoms and exposure to covid-19  She may return to work on 7/23/21               Sincerely,        Bobbi BRADLEY    CC: No Recipients

## 2021-07-16 NOTE — TELEPHONE ENCOUNTER
Patient is requesting an updated work note/letter to return to work after completing her quarantining? She said her symptoms started on Tuesday and she has been quarantining  Should this be 10 days or 14 days? So if it is 10 days the note should reflect that she will return to work on (7/23/21)  If it is 14 days the note should reflect that she will return to work on (7/27/21)    Patient can retrieve note/letter on Mychart   Please advise

## 2021-08-17 DIAGNOSIS — J45.990 EXERCISE-INDUCED ASTHMA: ICD-10-CM

## 2021-11-12 ENCOUNTER — OFFICE VISIT (OUTPATIENT)
Dept: INTERNAL MEDICINE CLINIC | Facility: CLINIC | Age: 21
End: 2021-11-12
Payer: COMMERCIAL

## 2021-11-12 VITALS
OXYGEN SATURATION: 97 % | TEMPERATURE: 97.9 F | BODY MASS INDEX: 25.92 KG/M2 | SYSTOLIC BLOOD PRESSURE: 108 MMHG | DIASTOLIC BLOOD PRESSURE: 98 MMHG | HEART RATE: 73 BPM | WEIGHT: 160.6 LBS

## 2021-11-12 DIAGNOSIS — R19.5 ABNORMAL STOOLS: ICD-10-CM

## 2021-11-12 DIAGNOSIS — Z91.018 FOOD ALLERGY: ICD-10-CM

## 2021-11-12 DIAGNOSIS — J45.990 EXERCISE-INDUCED ASTHMA: ICD-10-CM

## 2021-11-12 DIAGNOSIS — K62.89 RECTAL PAIN: ICD-10-CM

## 2021-11-12 DIAGNOSIS — Z00.00 ANNUAL PHYSICAL EXAM: Primary | ICD-10-CM

## 2021-11-12 PROCEDURE — 99395 PREV VISIT EST AGE 18-39: CPT | Performed by: NURSE PRACTITIONER

## 2021-11-12 PROCEDURE — 1036F TOBACCO NON-USER: CPT | Performed by: NURSE PRACTITIONER

## 2022-01-04 ENCOUNTER — NURSE TRIAGE (OUTPATIENT)
Dept: OTHER | Facility: OTHER | Age: 22
End: 2022-01-04

## 2022-01-04 NOTE — TELEPHONE ENCOUNTER
Reason for Disposition   [1] COVID-19 diagnosed by positive lab test (e g , PCR, rapid self-test kit) AND [2] mild symptoms (e g , cough, fever, others) AND [2] no complications or SOB    Answer Assessment - Initial Assessment Questions  Patient had positive rapid at home covid test, and wanted PCR for work  I informed patient that at this time we are not retesting rapid test and she should follow guide lines for a positive results   She asked about the isolation guidelines which I provided her with instructions per our guidelines    Protocols used: CORONAVIRUS (COVID-19) DIAGNOSED OR SUSPECTED-ADULT-[place]

## 2022-01-04 NOTE — TELEPHONE ENCOUNTER
Regarding: Covid-[facility]-Sore Throat  ----- Message from [facility] sent at 1/4/2022 11:47 AM EST -----  "I have a sore throat, runny nose and my at home Covid test came back positive "

## 2022-01-17 ENCOUNTER — OFFICE VISIT (OUTPATIENT)
Dept: INTERNAL MEDICINE CLINIC | Facility: CLINIC | Age: 22
End: 2022-01-17
Payer: COMMERCIAL

## 2022-01-17 ENCOUNTER — HOSPITAL ENCOUNTER (OUTPATIENT)
Dept: RADIOLOGY | Facility: HOSPITAL | Age: 22
Discharge: HOME | End: 2022-01-17
Attending: GENERAL ACUTE CARE HOSPITAL
Payer: COMMERCIAL

## 2022-01-17 ENCOUNTER — TELEPHONE (OUTPATIENT)
Dept: OTHER | Facility: OTHER | Age: 22
End: 2022-01-17

## 2022-01-17 VITALS
OXYGEN SATURATION: 95 % | WEIGHT: 165.8 LBS | DIASTOLIC BLOOD PRESSURE: 76 MMHG | BODY MASS INDEX: 26.65 KG/M2 | HEART RATE: 73 BPM | RESPIRATION RATE: 16 BRPM | TEMPERATURE: 97.7 F | HEIGHT: 66 IN | SYSTOLIC BLOOD PRESSURE: 112 MMHG

## 2022-01-17 DIAGNOSIS — M79.671 RIGHT FOOT PAIN: ICD-10-CM

## 2022-01-17 DIAGNOSIS — M25.571 ACUTE RIGHT ANKLE PAIN: ICD-10-CM

## 2022-01-17 DIAGNOSIS — M25.571 ACUTE RIGHT ANKLE PAIN: Primary | ICD-10-CM

## 2022-01-17 PROCEDURE — 99213 OFFICE O/P EST LOW 20 MIN: CPT | Performed by: GENERAL ACUTE CARE HOSPITAL

## 2022-01-17 PROCEDURE — 73610 X-RAY EXAM OF ANKLE: CPT

## 2022-01-17 PROCEDURE — 73630 X-RAY EXAM OF FOOT: CPT

## 2022-01-17 NOTE — PROGRESS NOTES
Assessment/Plan:    Acute right ankle pain  Exam showed right ankle slightly more swollen than left  Tenderness at posterior half of lateral side of right foot  Pain induced at same location while dorsiflex and inversion of right ankle  Suspect ligament injury  Obtain xray to rule out fracture  Refer to PT and podiatry  Heat/ice as needed, tylenol/ibuprofen as needed for symptoms relief  Work letter provided  Diagnoses and all orders for this visit:    Acute right ankle pain  -     XR ankle 3+ vw right; Future  -     Ambulatory referral to Podiatry; Future  -     Ambulatory Referral to Physical Therapy; Future    Right foot pain  -     XR foot 3+ vw right; Future  -     Ambulatory referral to Podiatry; Future  -     Ambulatory Referral to Physical Therapy; Future          Subjective:      Patient ID: Saloni Singh is a 19 y o  female  HPI  Right foot and ankle started Saturday night  No recent injury/fall/twist  Never had it before   gymnastics  Last  was Friday no injury recalled  Pain continues to get worse  Persistent  10/10 when bearing wt  7/10 when at worse  Pain mainly located at posterior half of lateral foot  Could only walk tip-toed       The following portions of the patient's history were reviewed and updated as appropriate: allergies, past family history, past social history and problem list     Review of Systems      Objective:      /76   Pulse 73   Temp 97 7 °F (36 5 °C)   Resp 16   Ht 5' 6" (1 676 m)   Wt 75 2 kg (165 lb 12 8 oz)   SpO2 95%   BMI 26 76 kg/m²          Physical Exam

## 2022-01-17 NOTE — PATIENT INSTRUCTIONS
For your foot and ankle pain, will get xray  You could try ice or heat whichever makes you feel better  Work letter provided  Try tylenol or ibuprofen as needed for pain relief  Also gave you referral you physical therapy and podiatry   If xray is negative for fracture and your pain does not improve, will recommend physical therapy first

## 2022-01-17 NOTE — LETTER
January 17, 2022     Patient: Jaja Bryant   YOB: 2000   Date of Visit: 1/17/2022       To Whom it May Concern:    Cyrus Jaffe is under my professional care  She was seen in my office on 1/17/2022  She has a medical condition that is under evaluation and treatment  I recommend home rest till symptoms improve  She may return to work on 1/24/2022 if symptoms improve  If you have any questions or concerns, please don't hesitate to call           Sincerely,          May Manriquez MD        CC: No Recipients

## 2022-01-17 NOTE — TELEPHONE ENCOUNTER
Patient has been having issues with her R foot for the last couple of days and is having difficulty walking  She would like someone to call her and let her know if she can be seen or what she should do

## 2022-01-17 NOTE — ASSESSMENT & PLAN NOTE
Exam showed right ankle slightly more swollen than left  Tenderness at posterior half of lateral side of right foot  Pain induced at same location while dorsiflex and inversion of right ankle  Suspect ligament injury  Obtain xray to rule out fracture  Refer to PT and podiatry  Heat/ice as needed, tylenol/ibuprofen as needed for symptoms relief  Work letter provided

## 2022-01-18 ENCOUNTER — OFFICE VISIT (OUTPATIENT)
Dept: PODIATRY | Facility: CLINIC | Age: 22
End: 2022-01-18
Payer: COMMERCIAL

## 2022-01-18 VITALS
HEIGHT: 66 IN | BODY MASS INDEX: 26.52 KG/M2 | DIASTOLIC BLOOD PRESSURE: 79 MMHG | SYSTOLIC BLOOD PRESSURE: 117 MMHG | WEIGHT: 165 LBS | HEART RATE: 88 BPM

## 2022-01-18 DIAGNOSIS — M25.571 ACUTE RIGHT ANKLE PAIN: ICD-10-CM

## 2022-01-18 DIAGNOSIS — M79.671 RIGHT FOOT PAIN: ICD-10-CM

## 2022-01-18 DIAGNOSIS — M76.71 PERONEAL TENDONITIS OF RIGHT LOWER EXTREMITY: Primary | ICD-10-CM

## 2022-01-18 PROCEDURE — 3008F BODY MASS INDEX DOCD: CPT | Performed by: PODIATRIST

## 2022-01-18 PROCEDURE — 1036F TOBACCO NON-USER: CPT | Performed by: PODIATRIST

## 2022-01-18 PROCEDURE — 99203 OFFICE O/P NEW LOW 30 MIN: CPT | Performed by: PODIATRIST

## 2022-01-18 RX ORDER — METHYLPREDNISOLONE 4 MG/1
TABLET ORAL
Qty: 1 EACH | Refills: 0 | Status: SHIPPED | OUTPATIENT
Start: 2022-01-18 | End: 2022-04-29

## 2022-01-18 NOTE — PATIENT INSTRUCTIONS
Ankle Exercises   AMBULATORY CARE:   What you need to know about ankle exercises: Ankle exercises help strengthen your ankle and improve its function after injury  These are beginning exercises  Ask your healthcare provider if you need to see a physical therapist for more advanced exercises  · Do these exercises 3 to 5 days a week , or as directed by your healthcare provider  Ask if you should perform the exercises on each ankle  · Do the exercises in the order that your healthcare provider recommends  This will help prevent swelling, chronic pain, and reinjury  Start with range of motion exercises  Then progress to strengthening exercises, and finally to balancing exercises  · Warm up before you do ankle exercises  Walk or ride a stationary bike for 5 to 10 minutes to prepare your ankle for movement  · Stop if you feel pain  It is normal to feel some discomfort at first  Regular exercise will help decrease your discomfort over time  How to perform range of motion exercises safely:  Begin with range of motion exercises to improve flexibility  Ask your healthcare provider when you can progress to strengthening exercises  · Ankle alphabet:  Sit on a chair so that your feet do not touch the floor  Use your big toe to write each letter of the alphabet  Use only your foot and ankle, and keep your movements small  Do 2 sets  · Calf stretches:      ? Sitting calf stretches with a towel:  Sit on the floor with both legs out straight in front of you  Loop a towel around the ball of your injured foot  Grasp the ends of the towel and pull it toward you  Keep your leg and back straight  Do not lean forward as you pull the towel  Hold for 30 seconds  Then relax for 30 seconds  Do 2 sets of 10          ? Standing calf stretches:  Stand facing a wall with the foot that is not injured forward and your knee slightly bent   Keep the leg with the injured foot straight and behind you with your toes pointed in slightly  With both heels flat on the floor, press your hips forward  Do not arch your back  Hold for 30 seconds, and then relax for 30 seconds  Do 2 sets of 10  Repeat with your leg bent  Do 2 sets of 10  How to perform strengthening exercises safely:  After you can perform range of motion exercises without pain, you may begin strengthening exercises  Ask your healthcare provider when you can progress to balancing exercises  · Ankle movement in 4 directions:  Sit on the floor with your legs straight in front of you  Keep your heels on the floor for support  ? Dorsiflexion:  Begin with your toes pointing straight up  Pull your toes toward your body  Slowly return to the starting position  Do 3 sets of 5      ? Plantar flexion:  Begin with your toes pointing straight up  Push your toes away from your body  Slowly return to the starting position  Do 3 sets of 5          ? Inversion:  Begin with your toes pointing straight up  Push your toes inward, toward each other  Slowly return to the starting position  Do 3 sets of 5      ? Eversion:  Begin with your toes pointing straight up  Push your toes outward, away from each other  Slowly return to the starting position  Do 3 sets of 5        · Toe curls with a towel:  Sit on a chair so that both of your feet are flat on the floor  Place a small towel on the floor in front of your injured foot  Grab the center of the towel with your toes and curl the towel toward you  Relax and repeat  Do 1 set of 5          · Elizabeth pick-ups:  Sit on a chair so that both of your feet are flat on the floor  Place 20 marbles on the floor in front of your injured foot  Use your toes to  one marble at a time and place it into a bowl  Repeat until you have picked up all the marbles  Do 1 set  · Heel raises:      ? Single leg heel raises:  Stand with your weight evenly on both feet  Hold on to a chair or a wall for balance   Lift the foot that is not injured off the floor so all your weight is placed on your injured foot  Raise the heel of your injured foot as high as you can  Slowly lower your heel to the floor  Do 1 set of 10          ? Double leg heel raises:  Stand with your weight evenly on both feet  Hold on to a chair or a wall for balance  Raise both of your heels as high as you can  Slowly lower your heels to the floor  Do 1 set of 10  · Heel and toe walks:      ? Heel walks:  Begin in a standing position  Lift your toes off the floor and walk on your heels  Keep your toes lifted as high as possible  Do 2 sets of 10          ? Toe walks:  Begin in a standing position  Lift your heels off the floor and walk on the balls and toes of your feet  Keep your heels lifted as high as possible  Do 2 sets of 10  How to perform a balance exercise safely:  After you can perform strengthening exercises without pain, you may do this beginning balancing exercise  Ask your healthcare provider for more advanced balance exercises  · Single leg stance:  Stand with your weight evenly on both feet, or hold on to a chair or a wall  Do not lean to the side  Lift the foot that is not injured off the floor so all your weight is placed on your injured foot  Balance on your injured foot  Ask your healthcare provider how long to hold this position  Contact your healthcare provider if:   · Your pain becomes worse  · You have new pain  · You have questions or concerns about your condition, care, or exercise program     © Copyright [employer] 2021 Information is for End User's use only and may not be sold, redistributed or otherwise used for commercial purposes  All illustrations and images included in CareNotes® are the copyrighted property of [employer]  or Pauline Dobbins   The above information is an  only  It is not intended as medical advice for individual conditions or treatments   Talk to your doctor, nurse or pharmacist before following any medical regimen to see if it is safe and effective for you

## 2022-01-18 NOTE — PROGRESS NOTES
Assessment/Plan:         Diagnoses and all orders for this visit:    Peroneal tendonitis of right lower extremity    Acute right ankle pain  -     Ambulatory referral to Podiatry  -     methylPREDNISolone 4 MG tablet therapy pack; Use as directed on package    Right foot pain  -     Ambulatory referral to Podiatry  -     methylPREDNISolone 4 MG tablet therapy pack; Use as directed on package      Diagnosis and options discussed with patient  Patient agreeable to the plan as stated below      I reviewed both the foot and ankle x-rays personally and discussed with the patient and her mother  No acute findings on x-ray  Patient's symptoms suggest peroneal acute tendinitis  She has a lot of pain tracing the peroneal tendon from insertion to the lateral malleolus  No significant ankle instability on exam   We discussed rice protocol, anti-inflammatories, temporary bracing, physical therapy  Check progress in 3 weeks  Patient is an avid gym this both as a child and now as a   She may have a acute on chronic peroneal tear  If she has not seen significant improvement consider MRI  Subjective:      Patient ID: Martin Mendez is a 19 y o  female  Patient had mild ankle pain Saturday  By Sunday/Monday she could barely walk on it  She went to her PCP who got xrays and told her to take NSAIDs  She is using a crutch due to the pain  There is swelling  She denies any initial trauma  No significant PMH  She points to her right lateral ankle, dorsal foot and heel pain  The following portions of the patient's history were reviewed and updated as appropriate:   She  has a past medical history of Neurologic disorder and Varicella  She   Patient Active Problem List    Diagnosis Date Noted    Acute right ankle pain 01/17/2022    Right foot pain 01/17/2022    Abnormal stools 11/12/2021    Sunburn 07/08/2019     She  has no past surgical history on file    Her family history includes Arthritis in her family; Cancer in her maternal grandmother; Hypertension in her family; Kidney disease in her maternal grandfather; Multiple sclerosis in her mother; Osteoarthritis in her family; Stroke in her maternal grandfather and other; Uterine cancer in her maternal aunt; Varicose Veins in her family  She  reports that she has never smoked  She has never used smokeless tobacco  She reports current alcohol use of about 1 0 - 3 0 standard drink of alcohol per week  She reports previous drug use  Drug: Marijuana  Current Outpatient Medications   Medication Sig Dispense Refill    beclomethasone (QVAR) 80 MCG/ACT inhaler Inhale 2 puffs 2 (two) times a day Rinse mouth after use  8 7 g 1    levonorgestrel-ethinyl estradiol (AVIANE,ALESSE,LESSINA) 0 1-20 MG-MCG per tablet Take 1 tablet by mouth daily 84 tablet 4    methylPREDNISolone 4 MG tablet therapy pack Use as directed on package 1 each 0    Pseudoephedrine-APAP-DM (DAYQUIL PO) Take 1 tablet by mouth (Patient not taking: Reported on 1/17/2022 )       No current facility-administered medications for this visit  Current Outpatient Medications on File Prior to Visit   Medication Sig    beclomethasone (QVAR) 80 MCG/ACT inhaler Inhale 2 puffs 2 (two) times a day Rinse mouth after use   levonorgestrel-ethinyl estradiol (AVIANE,ALESSE,LESSINA) 0 1-20 MG-MCG per tablet Take 1 tablet by mouth daily    Pseudoephedrine-APAP-DM (DAYQUIL PO) Take 1 tablet by mouth (Patient not taking: Reported on 1/17/2022 )     No current facility-administered medications on file prior to visit  She has No Known Allergies       Review of Systems   Constitutional: Negative  HENT: Negative  Respiratory: Negative  Cardiovascular: Negative  Gastrointestinal: Negative  Musculoskeletal: Positive for arthralgias, gait problem, joint swelling and myalgias  Skin: Negative for color change and wound  Neurological: Negative for weakness and numbness     Psychiatric/Behavioral: The patient is not nervous/anxious  Objective:      /79   Pulse 88   Ht 5' 6" (1 676 m) Comment: verbal  Wt 74 8 kg (165 lb)   BMI 26 63 kg/m²          Physical Exam      Vitals reviewed    Constitutional: Patient is not distressed  Patient is well developed  Patient is healthy weight obese  Vascular: Dorsalis pedis and posterior tibial pulses palpable  Capillary refill time within normal limits to all digits  No erythema  No edema  No significant varicosities  Dermatology: No rash  No open lesions  Present pedal hair  Skin has healthy turgor  Musculoskeletal: Normal range of motion to ankle, subtalar joint, and midtarsal joint  Normal range of motion first MTPJ  Manual muscle testing 5 out of 5 for inversion/dorsiflexion/plantarflexion  Stress eversion of the right ankle has sharp pain along the peroneal tendon from insertion to the lateral malleolus  Muscle strength and decreased due to pain and guarding along the peroneals  Negative anterior drawer  On stance patients feet are generally rectus  Neurological: Monofilament sensation is intact  Vibratory sensation is intact  Achilles reflex is normal    Proprioception is normal    Respiratory: Normal respiratory effort, no distress    Psych: Patient is AAOx3  Normal mood       Lymphatic: nonpalpable popliteal lymph nodes  Nonpalpable groin lymph nodes

## 2022-04-29 ENCOUNTER — OFFICE VISIT (OUTPATIENT)
Dept: OBGYN CLINIC | Facility: CLINIC | Age: 22
End: 2022-04-29
Payer: COMMERCIAL

## 2022-04-29 VITALS
DIASTOLIC BLOOD PRESSURE: 62 MMHG | HEIGHT: 66 IN | WEIGHT: 161 LBS | BODY MASS INDEX: 25.88 KG/M2 | SYSTOLIC BLOOD PRESSURE: 102 MMHG

## 2022-04-29 DIAGNOSIS — Z01.419 WELL FEMALE EXAM WITH ROUTINE GYNECOLOGICAL EXAM: Primary | ICD-10-CM

## 2022-04-29 DIAGNOSIS — Z12.4 ENCOUNTER FOR SCREENING FOR MALIGNANT NEOPLASM OF CERVIX: ICD-10-CM

## 2022-04-29 DIAGNOSIS — Z30.011 ENCOUNTER FOR PRESCRIPTION OF ORAL CONTRACEPTIVES: ICD-10-CM

## 2022-04-29 DIAGNOSIS — Z11.3 SCREENING FOR STDS (SEXUALLY TRANSMITTED DISEASES): ICD-10-CM

## 2022-04-29 PROCEDURE — 87491 CHLMYD TRACH DNA AMP PROBE: CPT | Performed by: OBSTETRICS & GYNECOLOGY

## 2022-04-29 PROCEDURE — G0145 SCR C/V CYTO,THINLAYER,RESCR: HCPCS | Performed by: OBSTETRICS & GYNECOLOGY

## 2022-04-29 PROCEDURE — 87591 N.GONORRHOEAE DNA AMP PROB: CPT | Performed by: OBSTETRICS & GYNECOLOGY

## 2022-04-29 PROCEDURE — 99395 PREV VISIT EST AGE 18-39: CPT | Performed by: OBSTETRICS & GYNECOLOGY

## 2022-04-29 RX ORDER — LEVONORGESTREL AND ETHINYL ESTRADIOL 0.1-0.02MG
1 KIT ORAL DAILY
Qty: 84 TABLET | Refills: 3 | Status: SHIPPED | OUTPATIENT
Start: 2022-04-29

## 2022-04-29 NOTE — PATIENT INSTRUCTIONS
[email or web address]&utm_campaign=Brand:+Core+Brand&utm_source=[email or web address]_medium=[id number]

## 2022-04-29 NOTE — PROGRESS NOTES
ASSESSMENT & PLAN:   Diagnoses and all orders for this visit:    Well female exam with routine gynecological exam  Encounter for screening for malignant neoplasm of cervix  -     Liquid-based pap, screening    Screening for STDs (sexually transmitted diseases)  -     Chlamydia/GC amplified DNA by PCR    Encounter for prescription of oral contraceptives  -     levonorgestrel-ethinyl estradiol Orquidea Micaelaour) 0 1-20 MG-MCG per tablet; Take 1 tablet by mouth daily      The following were reviewed in today's visit: ASCCP guidelines, Gardisil vaccination, STD testing breast self exam, STD testing, use and side effects of OCPs, exercise and healthy diet  Patient to return to office in yearly for annual exam      All questions have been answered to her satisfaction  CC:  Annual Gynecologic Examination  Chief Complaint   Patient presents with    Gynecologic Exam     Pt is here for her annual exam and pap smear  Last pap was 12/3/2019  Pt is doing well  HPI: Marlon Berman is a 19 y o  Larwence Araseli who presents for annual gynecologic examination  She has the following concerns:  Sometimes has an odor after she showers that lasts til her next shower  Discussed using dove or dial in vaginal area  Health Maintenance:    Exercise: frequently  Breast exams/breast awareness: no  Diet: well balanced diet      Past Medical History:   Diagnosis Date    Neurologic disorder     Black outs    Varicella        History reviewed  No pertinent surgical history  Past OB/Gyn History:  Period Cycle (Days): 30  Period Duration (Days): 5-7  Period Pattern: Regular  Menstrual Flow: Moderate,Light  Dysmenorrhea: NonePatient's last menstrual period was 04/04/2022  Last Pap: 2019 : no abnormalities  History of abnormal Pap smear: no  HPV vaccine completed: yes    Patient is currently sexually active     STD testing: no  Current contraception: OCP (estrogen/progesterone)      Family History  Family History   Problem Relation Age of Onset    Cancer Maternal Grandmother     Arthritis Family     Osteoarthritis Family     Hypertension Family     Varicose Veins Family         lower extremities    Stroke Other     Multiple sclerosis Mother     Stroke Maternal Grandfather         At least 2    Kidney disease Maternal Grandfather     Uterine cancer Maternal Aunt        Family history of uterine or ovarian cancer: yes  Family history of breast cancer: no  Family history of colon cancer: no    Social History:  Social History     Socioeconomic History    Marital status: Single     Spouse name: Not on file    Number of children: Not on file    Years of education: Not on file    Highest education level: Not on file   Occupational History    Not on file   Tobacco Use    Smoking status: Never Smoker    Smokeless tobacco: Never Used   Vaping Use    Vaping Use: Former    Substances: Nicotine, Flavoring   Substance and Sexual Activity    Alcohol use:  Yes     Alcohol/week: 1 0 - 3 0 standard drink     Types: 1 - 3 Glasses of wine per week     Comment: social    Drug use: Not Currently     Types: Marijuana     Comment: former    Sexual activity: Yes     Partners: Male     Birth control/protection: OCP   Other Topics Concern    Not on file   Social History Narrative    Daily caffeinated cola consumption    Drinks coffee     Social Determinants of Health     Financial Resource Strain: Not on file   Food Insecurity: Not on file   Transportation Needs: Not on file   Physical Activity: Not on file   Stress: Not on file   Social Connections: Not on file   Intimate Partner Violence: Not on file   Housing Stability: Not on file     Domestic violence screen: negative    Allergies:  No Known Allergies    Medications:    Current Outpatient Medications:     beclomethasone (QVAR) 80 MCG/ACT inhaler, Inhale 2 puffs 2 (two) times a day Rinse mouth after use , Disp: 8 7 g, Rfl: 1    levonorgestrel-ethinyl estradiol (Prashanth Wells) 0 1-20 MG-MCG per tablet, Take 1 tablet by mouth daily, Disp: 84 tablet, Rfl: 3    Review of Systems:  Review of Systems   Constitutional: Negative for chills and fever  Respiratory: Negative for cough and shortness of breath  Cardiovascular: Negative for chest pain and palpitations  Gastrointestinal: Negative for abdominal distention, abdominal pain, blood in stool, constipation, nausea and vomiting  Genitourinary: Negative for difficulty urinating, dyspareunia, dysuria, frequency, menstrual problem, pelvic pain, urgency, vaginal bleeding, vaginal discharge and vaginal pain  Neurological: Negative for headaches  Physical Exam:  /62 (BP Location: Right arm, Patient Position: Sitting, Cuff Size: Large)   Ht 5' 6" (1 676 m)   Wt 73 kg (161 lb)   LMP 04/04/2022   BMI 25 99 kg/m²    Physical Exam  Constitutional:       General: She is awake  Appearance: Normal appearance  She is well-developed  Genitourinary:      Vulva and bladder normal       Right Labia: No rash, tenderness or lesions  Left Labia: No tenderness, lesions or rash  No vaginal discharge, erythema, tenderness or bleeding  No vaginal prolapse present  No vaginal atrophy present  Right Adnexa: not tender, not full and no mass present  Left Adnexa: not tender, not full and no mass present  Cervix is nulliparous  No cervical motion tenderness, discharge, lesion or polyp  Uterus is not enlarged, tender or irregular  No uterine mass detected  Uterus is anteverted  No urethral prolapse present  Bladder is not tender  Pelvic exam was performed with patient in the lithotomy position  Breasts:      Right: No inverted nipple, mass, nipple discharge, skin change, tenderness, axillary adenopathy or supraclavicular adenopathy  Left: No inverted nipple, mass, nipple discharge, skin change, tenderness, axillary adenopathy or supraclavicular adenopathy         HENT: Head: Normocephalic and atraumatic  Cardiovascular:      Rate and Rhythm: Normal rate and regular rhythm  Heart sounds: Normal heart sounds  Pulmonary:      Effort: Pulmonary effort is normal  No tachypnea or respiratory distress  Breath sounds: Normal breath sounds  Abdominal:      General: Abdomen is flat  There is no distension  Palpations: Abdomen is soft  Tenderness: There is no abdominal tenderness  There is no guarding or rebound  Musculoskeletal:      Cervical back: Neck supple  Lymphadenopathy:      Upper Body:      Right upper body: No supraclavicular or axillary adenopathy  Left upper body: No supraclavicular or axillary adenopathy  Neurological:      General: No focal deficit present  Mental Status: She is alert and oriented to person, place, and time  Psychiatric:         Mood and Affect: Mood normal          Behavior: Behavior normal          Thought Content: Thought content normal          Judgment: Judgment normal    Vitals reviewed

## 2022-05-05 LAB
C TRACH DNA SPEC QL NAA+PROBE: NEGATIVE
N GONORRHOEA DNA SPEC QL NAA+PROBE: NEGATIVE

## 2022-05-06 LAB
LAB AP GYN PRIMARY INTERPRETATION: NORMAL
Lab: NORMAL
PATH INTERP SPEC-IMP: NORMAL

## 2022-06-07 ENCOUNTER — TELEPHONE (OUTPATIENT)
Dept: INTERNAL MEDICINE CLINIC | Facility: CLINIC | Age: 22
End: 2022-06-07

## 2022-06-07 NOTE — TELEPHONE ENCOUNTER
If she is getting a lot worse she can go to urgent care or ER today   Otherwise she can wait til tomorrow

## 2022-06-07 NOTE — TELEPHONE ENCOUNTER
The patient has a rash it is all over her body  Her face is the worst  She stated her face is peeling  How should she proceed? She made an appointment to see you tomorrow  She cannot come in today  Please advise   Thank you

## 2022-06-08 ENCOUNTER — OFFICE VISIT (OUTPATIENT)
Dept: INTERNAL MEDICINE CLINIC | Facility: CLINIC | Age: 22
End: 2022-06-08
Payer: COMMERCIAL

## 2022-06-08 VITALS
DIASTOLIC BLOOD PRESSURE: 82 MMHG | HEART RATE: 63 BPM | BODY MASS INDEX: 25.68 KG/M2 | TEMPERATURE: 98.5 F | SYSTOLIC BLOOD PRESSURE: 139 MMHG | WEIGHT: 159.8 LBS | OXYGEN SATURATION: 97 % | HEIGHT: 66 IN

## 2022-06-08 DIAGNOSIS — R21 RASH: Primary | ICD-10-CM

## 2022-06-08 DIAGNOSIS — R21 RASH: ICD-10-CM

## 2022-06-08 DIAGNOSIS — Z23 ENCOUNTER FOR IMMUNIZATION: ICD-10-CM

## 2022-06-08 PROCEDURE — 99213 OFFICE O/P EST LOW 20 MIN: CPT | Performed by: NURSE PRACTITIONER

## 2022-06-08 PROCEDURE — 3008F BODY MASS INDEX DOCD: CPT | Performed by: NURSE PRACTITIONER

## 2022-06-08 PROCEDURE — 1036F TOBACCO NON-USER: CPT | Performed by: NURSE PRACTITIONER

## 2022-06-08 PROCEDURE — 3725F SCREEN DEPRESSION PERFORMED: CPT | Performed by: NURSE PRACTITIONER

## 2022-06-08 RX ORDER — DESONIDE 0.5 MG/G
CREAM TOPICAL 2 TIMES DAILY
Qty: 30 G | Refills: 0 | Status: SHIPPED | OUTPATIENT
Start: 2022-06-08 | End: 2022-06-08 | Stop reason: SDUPTHER

## 2022-06-08 RX ORDER — METHYLPREDNISOLONE 4 MG/1
TABLET ORAL
Qty: 21 EACH | Refills: 0 | Status: SHIPPED | OUTPATIENT
Start: 2022-06-08 | End: 2022-06-09 | Stop reason: SDUPTHER

## 2022-06-08 RX ORDER — DESONIDE 0.5 MG/G
CREAM TOPICAL 2 TIMES DAILY
Qty: 30 G | Refills: 0 | Status: SHIPPED | OUTPATIENT
Start: 2022-06-08 | End: 2022-06-09 | Stop reason: SDUPTHER

## 2022-06-08 RX ORDER — METHYLPREDNISOLONE 4 MG/1
TABLET ORAL
Qty: 21 EACH | Refills: 0 | Status: SHIPPED | OUTPATIENT
Start: 2022-06-08 | End: 2022-06-08 | Stop reason: SDUPTHER

## 2022-06-08 NOTE — PROGRESS NOTES
Assessment/Plan:    Rash  Schedule patient an appointment with dermatologist tomorrow  Start steroids and cream       Diagnoses and all orders for this visit:    Rash  -     Discontinue: desonide (DESOWEN) 0 05 % cream; Apply topically 2 (two) times a day  -     Discontinue: methylPREDNISolone 4 MG tablet therapy pack; Use as directed on package  -     Ambulatory Referral to Dermatology; Future    Encounter for immunization          Subjective:      Patient ID: Martha Wylie is a 19 y o  female  Patient is here for very red irritated rash of her entire face arms and legs that started May 31st   She was out in the sun but she did not get summer on  The rash is very red and itchy      The following portions of the patient's history were reviewed and updated as appropriate: allergies, current medications, past family history, past medical history, past social history, past surgical history and problem list     Review of Systems   Constitutional: Negative  HENT: Negative  Eyes: Negative  Respiratory: Negative  Cardiovascular: Negative  Gastrointestinal: Negative  Musculoskeletal: Negative  Skin: Positive for rash  Neurological: Negative  Objective:      /82   Pulse 63   Temp 98 5 °F (36 9 °C) (Tympanic)   Ht 5' 6" (1 676 m)   Wt 72 5 kg (159 lb 12 8 oz)   SpO2 97%   BMI 25 79 kg/m²          Physical Exam  Vitals reviewed  Constitutional:       Appearance: Normal appearance  Skin:     Findings: Erythema and rash present  Neurological:      Mental Status: She is alert

## 2022-06-09 DIAGNOSIS — R21 RASH: ICD-10-CM

## 2022-06-09 RX ORDER — DESONIDE 0.5 MG/G
CREAM TOPICAL 2 TIMES DAILY
Qty: 30 G | Refills: 0 | Status: SHIPPED | OUTPATIENT
Start: 2022-06-09

## 2022-06-09 RX ORDER — METHYLPREDNISOLONE 4 MG/1
TABLET ORAL
Qty: 21 EACH | Refills: 0 | Status: SHIPPED | OUTPATIENT
Start: 2022-06-09

## 2022-09-13 ENCOUNTER — TELEPHONE (OUTPATIENT)
Dept: OBGYN CLINIC | Facility: CLINIC | Age: 22
End: 2022-09-13

## 2022-09-13 DIAGNOSIS — B37.31 VULVOVAGINAL CANDIDIASIS: Primary | ICD-10-CM

## 2022-09-13 RX ORDER — FLUCONAZOLE 150 MG/1
150 TABLET ORAL ONCE
Qty: 1 TABLET | Refills: 0 | Status: SHIPPED | OUTPATIENT
Start: 2022-09-13 | End: 2022-09-13

## 2022-09-13 NOTE — TELEPHONE ENCOUNTER
Patient called, she is having symptoms of a yeast infection  She is having vaginal itching and white clumpy discharge  Are you able to send Fluconazole to the pharmacy for her   [facility] uses [facility] [place] [facility] [address]

## 2023-02-06 ENCOUNTER — TELEMEDICINE (OUTPATIENT)
Dept: INTERNAL MEDICINE CLINIC | Facility: CLINIC | Age: 23
End: 2023-02-06

## 2023-02-06 ENCOUNTER — TELEPHONE (OUTPATIENT)
Dept: INTERNAL MEDICINE CLINIC | Facility: CLINIC | Age: 23
End: 2023-02-06

## 2023-02-06 DIAGNOSIS — B34.9 NONSPECIFIC SYNDROME SUGGESTIVE OF VIRAL ILLNESS: Primary | ICD-10-CM

## 2023-02-06 NOTE — PROGRESS NOTES
COVID-19 Outpatient Progress Note    Assessment/Plan:    Problem List Items Addressed This Visit    None  Visit Diagnoses     Nonspecific syndrome suggestive of viral illness    -  Primary         Disposition:     While awaiting the results of covid testing, patient was advised to isolate  Supportive care per discussion  Patient will take home test-will call with results  Return to work date to be determined    I have spent 10 minutes directly with the patient  Greater than 50% of this time was spent in counseling/coordination of care regarding: prognosis, risks and benefits of treatment options, instructions for management, patient and family education, importance of treatment compliance, risk factor reductions and impressions  Encounter provider: Adonay Benedict [address] [place] [address]     Provider located at: [address] [place] [phone]     Recent Visits  No visits were found meeting these conditions  Showing recent visits within past 7 days and meeting all other requirements  Today's Visits  Date Type Provider Dept   02/06/23 Telemedicine Felix Jha today's visits and meeting all other requirements  Future Appointments  No visits were found meeting these conditions  Showing future appointments within next 150 days and meeting all other requirements     This virtual check-in was done via [employer] and patient was informed that this is a secure, HIPAA-compliant platform  She agrees to proceed  Patient agrees to participate in a virtual check in via telephone or video visit instead of presenting to the office to address urgent/immediate medical needs  Patient is aware this is a billable service  She acknowledged consent and understanding of privacy and security of the video platform  The patient has agreed to participate and understands they can discontinue the visit at any time      After connecting through Televideo, the patient was identified by name and date of birth  Felisha Cordova was informed that this was a telemedicine visit and that the exam was being conducted confidentially over secure lines  My office door was closed  No one else was in the room  Felisha Cordova acknowledged consent and understanding of privacy and security of the telemedicine visit  I informed the patient that I have reviewed her record in Epic and presented the opportunity for her to ask any questions regarding the visit today  The patient agreed to participate  Verification of patient location:  Patient is located in the following state in which I hold an active license: [place]    Subjective:   Felisha Cordova is a 23 y o  female who is concerned about COVID-19  Patient's symptoms include fatigue, nasal congestion, rhinorrhea, sore throat, cough and diarrhea  Patient denies fever, chills, shortness of breath, chest tightness and headaches  - Date of symptom onset: 2/3/2023      COVID-19 vaccination status: Partially vaccinated    Exposure:   Contact with a person who is under investigation (PUI) for or who is positive for COVID-19 within the last 14 days?: No    Hospitalized recently for fever and/or lower respiratory symptoms?: No      Currently a healthcare worker that is involved in direct patient care?: No      Works in a special setting where the risk of COVID-19 transmission may be high? (this may include long-term care, correctional and senior care facilities; homeless shelters; assisted-living facilities and group homes ): No      Resident in a special setting where the risk of COVID-19 transmission may be high? (this may include long-term care, correctional and senior care facilities; homeless shelters; assisted-living facilities and group homes ): No      Lab Results   Component Value Date    SARSCOV2 Negative 07/09/2021       Review of Systems   Constitutional: Positive for fatigue  Negative for chills and fever  HENT: Positive for congestion, rhinorrhea and sore throat  Respiratory: Positive for cough  Negative for chest tightness and shortness of breath  Cardiovascular: Negative for chest pain  Gastrointestinal: Positive for diarrhea  Neurological: Negative for dizziness, weakness and headaches  Current Outpatient Medications on File Prior to Visit   Medication Sig   • beclomethasone (QVAR) 80 MCG/ACT inhaler Inhale 2 puffs 2 (two) times a day Rinse mouth after use  • desonide (DESOWEN) 0 05 % cream Apply topically 2 (two) times a day   • levonorgestrel-ethinyl estradiol Elby Jose) 0 1-20 MG-MCG per tablet Take 1 tablet by mouth daily   • methylPREDNISolone 4 MG tablet therapy pack Use as directed on package       Objective: There were no vitals taken for this visit  Physical Exam  Constitutional:       Appearance: Normal appearance  She is not ill-appearing  Neurological:      Mental Status: She is alert         [place], [place]

## 2023-02-06 NOTE — LETTER
February 6, 2023     Patient: Bianka Jay  YOB: 2000  Date of Visit: 2/6/2023      To Whom it May Concern:    Portillo Murillo is under my professional care  Rahul Montoya was seen in my office on 2/6/2023  Rahul Montoya is excused 2/6/23  If you have any questions or concerns, please don't hesitate to call           Sincerely,          MIRNA Carter        CC: No Recipients

## 2023-04-02 DIAGNOSIS — Z30.011 ENCOUNTER FOR PRESCRIPTION OF ORAL CONTRACEPTIVES: ICD-10-CM

## 2023-04-03 RX ORDER — LEVONORGESTREL AND ETHINYL ESTRADIOL 0.1-0.02MG
KIT ORAL
Qty: 84 TABLET | Refills: 3 | Status: SHIPPED | OUTPATIENT
Start: 2023-04-03

## 2023-06-07 ENCOUNTER — ANNUAL EXAM (OUTPATIENT)
Dept: OBGYN CLINIC | Facility: CLINIC | Age: 23
End: 2023-06-07
Payer: COMMERCIAL

## 2023-06-07 VITALS
HEIGHT: 66 IN | WEIGHT: 178 LBS | BODY MASS INDEX: 28.61 KG/M2 | DIASTOLIC BLOOD PRESSURE: 68 MMHG | SYSTOLIC BLOOD PRESSURE: 118 MMHG

## 2023-06-07 DIAGNOSIS — Z11.3 ROUTINE SCREENING FOR STI (SEXUALLY TRANSMITTED INFECTION): ICD-10-CM

## 2023-06-07 DIAGNOSIS — Z01.419 WOMEN'S ANNUAL ROUTINE GYNECOLOGICAL EXAMINATION: Primary | ICD-10-CM

## 2023-06-07 PROCEDURE — S0612 ANNUAL GYNECOLOGICAL EXAMINA: HCPCS | Performed by: PHYSICIAN ASSISTANT

## 2023-06-07 NOTE — PROGRESS NOTES
ASSESSMENT & PLAN:   Diagnoses and all orders for this visit:    Women's annual routine gynecological examination    Routine screening for STI (sexually transmitted infection)  -     Cancel: Sureswab(R), Vaginosis/Vaginitis Plus  -     Sureswab(R), Vaginosis/Vaginitis Plus          The following were reviewed in today's visit: ASCCP guidelines, Gardisil vaccination, STD testing breast self exam, STD testing, exercise and healthy diet  Patient to return to office in yearly for annual exam      All questions have been answered to her satisfaction  CC:  Annual Gynecologic Examination  Chief Complaint   Patient presents with   • Gynecologic Exam     Pt here today for her annual exam  Pap 4/29/2022 neg/neg HPV       HPI: Elton Spann is a 23 y o  Thais Patrick who presents for annual gynecologic examination  She has the following concerns:  None at this time  Health Maintenance:    Exercise: frequently  Breast exams/breast awareness: yes  Diet: eats a lot of pasta, eating out  Working on improving, cooking at home      Past Medical History:   Diagnosis Date   • Neurologic disorder     Black outs   • Varicella        Past Surgical History:   Procedure Laterality Date   • WISDOM TOOTH EXTRACTION         Past OB/Gyn History:  Period Cycle (Days): 28  Period Duration (Days): 7  Period Pattern: Regular  Menstrual Flow: Moderate  Menstrual Control: Maxi padPatient's last menstrual period was 05/28/2023 (exact date)  Last Pap: 2022 : no abnormalities  History of abnormal Pap smear: no  HPV vaccine completed: yes    Patient is currently sexually active     STD testing: yes  Current contraception: OCP (estrogen/progesterone)      Family History  Family History   Problem Relation Age of Onset   • Cancer Maternal Grandmother    • Arthritis Family    • Osteoarthritis Family    • Hypertension Family    • Varicose Veins Family         lower extremities   • Stroke Other    • Multiple sclerosis Mother    • Stroke Maternal Grandfather         At least 2   • Kidney disease Maternal Grandfather    • Uterine cancer Maternal Aunt        Family history of uterine or ovarian cancer: yes  Family history of breast cancer: no  Family history of colon cancer: no    Social History:  Social History     Socioeconomic History   • Marital status: Single     Spouse name: Not on file   • Number of children: Not on file   • Years of education: Not on file   • Highest education level: Not on file   Occupational History   • Not on file   Tobacco Use   • Smoking status: Never   • Smokeless tobacco: Never   Vaping Use   • Vaping Use: Former   • Start date: 6/13/2018   • Quit date: 7/14/2021   • Substances: Nicotine, Flavoring   Substance and Sexual Activity   • Alcohol use:  Yes     Alcohol/week: 1 0 - 3 0 standard drink of alcohol     Types: 1 - 3 Glasses of wine per week     Comment: social   • Drug use: Not Currently     Types: Marijuana     Comment: former   • Sexual activity: Yes     Partners: Male     Birth control/protection: OCP   Other Topics Concern   • Not on file   Social History Narrative    Daily caffeinated cola consumption    Drinks coffee     Social Determinants of Health     Financial Resource Strain: Not on file   Food Insecurity: Not on file   Transportation Needs: Not on file   Physical Activity: Not on file   Stress: Not on file   Social Connections: Not on file   Intimate Partner Violence: Not on file   Housing Stability: Not on file     Domestic violence screen: negative    Allergies:  No Known Allergies    Medications:    Current Outpatient Medications:   •  beclomethasone (QVAR) 80 MCG/ACT inhaler, Inhale 2 puffs 2 (two) times a day Rinse mouth after use , Disp: 8 7 g, Rfl: 1  •  desonide (DESOWEN) 0 05 % cream, Apply topically 2 (two) times a day, Disp: 30 g, Rfl: 0  •  Vienva 0 1-20 MG-MCG per tablet, TAKE 1 TABLET BY MOUTH EVERY DAY, Disp: 84 tablet, Rfl: 3    Review of Systems:  Review of Systems   Constitutional: "Negative for chills, fever and unexpected weight change  Respiratory: Negative for shortness of breath  Cardiovascular: Negative for chest pain  Gastrointestinal: Negative for abdominal pain, diarrhea, nausea and vomiting  Skin: Negative for rash  Psychiatric/Behavioral: Negative for dysphoric mood  The patient is not nervous/anxious  Physical Exam:  /68 (BP Location: Right arm, Patient Position: Sitting, Cuff Size: Standard)   Ht 5' 6\" (1 676 m)   Wt 80 7 kg (178 lb)   LMP 05/28/2023 (Exact Date)   BMI 28 73 kg/m²    Physical Exam  Constitutional:       Appearance: Normal appearance  Genitourinary:      Vulva and urethral meatus normal       No lesions in the vagina  Right Labia: No rash or lesions  Left Labia: No lesions or rash  No vaginal discharge, erythema or bleeding  Right Adnexa: not tender and no mass present  Left Adnexa: not tender and no mass present  No cervical discharge or lesion  Uterus is not tender  Breasts:     Breasts are symmetrical       Right: No mass or skin change  Left: No mass or skin change  HENT:      Head: Normocephalic and atraumatic  Cardiovascular:      Rate and Rhythm: Normal rate and regular rhythm  Heart sounds: Normal heart sounds  No murmur heard  No friction rub  No gallop  Pulmonary:      Effort: Pulmonary effort is normal       Breath sounds: Normal breath sounds  No wheezing, rhonchi or rales  Abdominal:      General: Abdomen is flat  There is no distension  Palpations: Abdomen is soft  Tenderness: There is no abdominal tenderness  Musculoskeletal:      Cervical back: Neck supple  Lymphadenopathy:      Upper Body:      Right upper body: No axillary adenopathy  Left upper body: No axillary adenopathy  Neurological:      General: No focal deficit present  Mental Status: She is alert  Skin:     General: Skin is warm and dry     Psychiatric:         Mood and " Affect: Mood normal          Behavior: Behavior normal    Vitals reviewed

## 2023-06-08 LAB
BV BACTERIA RRNA VAG QL NAA+PROBE: NEGATIVE
C GLABRATA RNA VAG QL NAA+PROBE: NOT DETECTED
C TRACH RRNA SPEC QL NAA+PROBE: DETECTED
CANDIDA RRNA VAG QL PROBE: NOT DETECTED
N GONORRHOEA RRNA SPEC QL NAA+PROBE: NOT DETECTED
T VAGINALIS RRNA SPEC QL NAA+PROBE: NOT DETECTED

## 2023-06-09 DIAGNOSIS — A74.9 CHLAMYDIA INFECTION: Primary | ICD-10-CM

## 2023-06-09 RX ORDER — DOXYCYCLINE 100 MG/1
100 TABLET ORAL 2 TIMES DAILY
Qty: 14 TABLET | Refills: 0 | Status: SHIPPED | OUTPATIENT
Start: 2023-06-09 | End: 2023-06-16

## 2023-11-10 ENCOUNTER — OFFICE VISIT (OUTPATIENT)
Dept: URGENT CARE | Age: 23
End: 2023-11-10
Payer: COMMERCIAL

## 2023-11-10 ENCOUNTER — TELEPHONE (OUTPATIENT)
Age: 23
End: 2023-11-10

## 2023-11-10 VITALS
RESPIRATION RATE: 18 BRPM | HEART RATE: 67 BPM | SYSTOLIC BLOOD PRESSURE: 118 MMHG | TEMPERATURE: 97.7 F | OXYGEN SATURATION: 97 % | DIASTOLIC BLOOD PRESSURE: 80 MMHG

## 2023-11-10 DIAGNOSIS — H66.002 NON-RECURRENT ACUTE SUPPURATIVE OTITIS MEDIA OF LEFT EAR WITHOUT SPONTANEOUS RUPTURE OF TYMPANIC MEMBRANE: Primary | ICD-10-CM

## 2023-11-10 DIAGNOSIS — J06.9 ACUTE URI: ICD-10-CM

## 2023-11-10 PROCEDURE — G0382 LEV 3 HOSP TYPE B ED VISIT: HCPCS

## 2023-11-10 PROCEDURE — S9083 URGENT CARE CENTER GLOBAL: HCPCS

## 2023-11-10 RX ORDER — AMOXICILLIN AND CLAVULANATE POTASSIUM 875; 125 MG/1; MG/1
1 TABLET, FILM COATED ORAL EVERY 12 HOURS SCHEDULED
Qty: 10 TABLET | Refills: 0 | Status: SHIPPED | OUTPATIENT
Start: 2023-11-10 | End: 2023-11-15

## 2023-11-10 NOTE — PROGRESS NOTES
[facility]        NAME: Myla Perez is a 20 y.o. female  : 2000    MRN: [id number]  DATE: November 10, 2023  TIME: 11:53 AM    Assessment and Plan   Non-recurrent acute suppurative otitis media of left ear without spontaneous rupture of tympanic membrane [H66.002]  1. Non-recurrent acute suppurative otitis media of left ear without spontaneous rupture of tympanic membrane  amoxicillin-clavulanate (AUGMENTIN) 875-125 mg per tablet      2. Acute URI              Patient Instructions     Take Augmentin 2 times daily for the next 5 days. Ensure adequate fluid intake. May continue with ibuprofen or Tylenol as needed for pain. May continue with over-the-counter cough and cold medication as necessary. Follow up with PCP in 3-5 days. Proceed to  ER if symptoms worsen. Chief Complaint     Chief Complaint   Patient presents with    Earache    Cold Like Symptoms    Fatigue     Symptoms started a week ago and are progressively getting worse         History of Present Illness       64-year-old female presenting for evaluation of ear pain. Patient states that over the past week she has been experiencing upper respiratory symptoms including cough and congestion. She states that over the past 3 days she has been having increased pain to her ears. She denies any drainage or decreased hearing from her ears. She has been taking over-the-counter DayQuil and NyQuil with mild relief of her symptoms. She denies any chest pain, shortness of breath, measured fevers or chills. Denies any known sick exposures. Earache   Associated symptoms include coughing. Pertinent negatives include no diarrhea, sore throat or vomiting. Fatigue  Associated symptoms include congestion, coughing and fatigue. Pertinent negatives include no chest pain, chills, fever, nausea, sore throat or vomiting. Review of Systems   Review of Systems   Constitutional:  Positive for fatigue. Negative for chills and fever. HENT:  Positive for congestion and ear pain. Negative for sore throat. Respiratory:  Positive for cough. Negative for shortness of breath. Cardiovascular:  Negative for chest pain. Gastrointestinal:  Negative for diarrhea, nausea and vomiting. All other systems reviewed and are negative. Current Medications       Current Outpatient Medications:     amoxicillin-clavulanate (AUGMENTIN) 875-125 mg per tablet, Take 1 tablet by mouth every 12 (twelve) hours for 5 days, Disp: 10 tablet, Rfl: 0    beclomethasone (QVAR) 80 MCG/ACT inhaler, Inhale 2 puffs 2 (two) times a day Rinse mouth after use., Disp: 8.7 g, Rfl: 1    desonide (DESOWEN) 0.05 % cream, Apply topically 2 (two) times a day, Disp: 30 g, Rfl: 0    Vienva 0.1-20 MG-MCG per tablet, TAKE 1 TABLET BY MOUTH EVERY DAY, Disp: 84 tablet, Rfl: 3    Current Allergies     Allergies as of 11/10/2023    (No Known Allergies)            The following portions of the patient's history were reviewed and updated as appropriate: allergies, current medications, past family history, past medical history, past social history, past surgical history and problem list.     Past Medical History:   Diagnosis Date    Neurologic disorder     Black outs    Varicella        Past Surgical History:   Procedure Laterality Date    WISDOM TOOTH EXTRACTION         Family History   Problem Relation Age of Onset    Cancer Maternal Grandmother     Arthritis Family     Osteoarthritis Family     Hypertension Family     Varicose Veins Family         lower extremities    Stroke Other     Multiple sclerosis Mother     Stroke Maternal Grandfather         At least 2    Kidney disease Maternal Grandfather     Uterine cancer Maternal Aunt          Medications have been verified. Objective   /80   Pulse 67   Temp 97.7 °F (36.5 °C)   Resp 18   SpO2 97%        Physical Exam     Physical Exam  Vitals and nursing note reviewed.    Constitutional:       General: She is not in acute distress. Appearance: Normal appearance. She is normal weight. She is not ill-appearing, toxic-appearing or diaphoretic. HENT:      Head: Normocephalic and atraumatic. Right Ear: Tympanic membrane normal.      Left Ear: Tympanic membrane is injected, erythematous and retracted. Nose: Congestion and rhinorrhea present. Mouth/Throat:      Mouth: Mucous membranes are moist.      Pharynx: Oropharynx is clear. No oropharyngeal exudate or posterior oropharyngeal erythema. Eyes:      General:         Right eye: No discharge. Left eye: No discharge. Cardiovascular:      Rate and Rhythm: Normal rate and regular rhythm. Pulses: Normal pulses. Heart sounds: Normal heart sounds. No murmur heard. No friction rub. No gallop. Pulmonary:      Effort: Pulmonary effort is normal. No respiratory distress. Breath sounds: Normal breath sounds. No stridor. No wheezing, rhonchi or rales. Chest:      Chest wall: No tenderness. Abdominal:      General: Bowel sounds are normal.      Palpations: Abdomen is soft. Tenderness: There is no abdominal tenderness. Skin:     General: Skin is warm and dry. Neurological:      Mental Status: She is alert.    Psychiatric:         Mood and Affect: Mood normal.         Behavior: Behavior normal.

## 2023-11-10 NOTE — TELEPHONE ENCOUNTER
Dena Emmanuel thinks she has an ear infection. Started as a head cold last Friday, ears popped on Sunday, has had ear discomfort all week but since this morning the pain has increased. Left ear is worse pain wise than the right. She's currently taking aleve for the ear pain.     Tried getting patient a same day appt but did not see any openings, patient has to go into work at 1 pm. Please advise

## 2023-11-10 NOTE — PATIENT INSTRUCTIONS
Take Augmentin 2 times daily for the next 5 days. Ensure adequate fluid intake. May continue with ibuprofen or Tylenol as needed for pain. May continue with over-the-counter cough and cold medication as necessary. 1.  Drink plenty fluids. 2.  Take probiotics [i.e. Yogurt, Acidophilus, Florastor (liquid)] daily. 3.  Over-the-counter medications as needed for symptomatic care. 4.   Advance activities as tolerated. 5.   Follow-up with your primary care physician in 3-4 days. 6.  Go to emergency room if symptoms are worsening.     7.  Use a humidifier at bedtime

## 2023-11-17 ENCOUNTER — OFFICE VISIT (OUTPATIENT)
Dept: INTERNAL MEDICINE CLINIC | Facility: CLINIC | Age: 23
End: 2023-11-17
Payer: COMMERCIAL

## 2023-11-17 VITALS
OXYGEN SATURATION: 96 % | SYSTOLIC BLOOD PRESSURE: 120 MMHG | DIASTOLIC BLOOD PRESSURE: 76 MMHG | HEART RATE: 82 BPM | WEIGHT: 171.4 LBS | BODY MASS INDEX: 27.66 KG/M2

## 2023-11-17 DIAGNOSIS — H66.002 NON-RECURRENT ACUTE SUPPURATIVE OTITIS MEDIA OF LEFT EAR WITHOUT SPONTANEOUS RUPTURE OF TYMPANIC MEMBRANE: Primary | ICD-10-CM

## 2023-11-17 PROCEDURE — 99213 OFFICE O/P EST LOW 20 MIN: CPT | Performed by: INTERNAL MEDICINE

## 2023-11-17 RX ORDER — CEFDINIR 300 MG/1
300 CAPSULE ORAL 2 TIMES DAILY
Qty: 20 CAPSULE | Refills: 0 | Status: SHIPPED | OUTPATIENT
Start: 2023-11-17 | End: 2023-11-27

## 2023-11-17 NOTE — PROGRESS NOTES
Name: Rachel Walker      : 2000      MRN: [id number]  Encounter Provider: Tatyana Ovalles MD  Encounter Date: 2023   Encounter department: [facility]     1. Non-recurrent acute suppurative otitis media of left ear without spontaneous rupture of tympanic membrane  -     cefdinir (OMNICEF) 300 mg capsule; Take 1 capsule (300 mg total) by mouth 2 (two) times a day for 10 days    -Patient failed initial therapy with 5-day course of Augmentin  -She will now be given a prescription for cefdinir 300 mg twice daily x10 days  -Patient to notify our office if there is no improvement in symptoms       Subjective      HPI  Patient presents today complaining of continued left ear pain. She was seen at an urgent care on 11/10 and diagnosed with an acute otitis media. She was started on a 5-day course of Augmentin. She reports her pain is no longer excruciating however she is still experiencing discomfort in her left ear. She denies any fevers or chills. All other systems negative except for pertinent findings noted in HPI. Current Outpatient Medications on File Prior to Visit   Medication Sig   • beclomethasone (QVAR) 80 MCG/ACT inhaler Inhale 2 puffs 2 (two) times a day Rinse mouth after use.    • desonide (DESOWEN) 0.05 % cream Apply topically 2 (two) times a day   • Vienva 0.1-20 MG-MCG per tablet TAKE 1 TABLET BY MOUTH EVERY DAY       Objective     /76   Pulse 82   Wt 77.7 kg (171 lb 6.4 oz)   SpO2 96%   BMI 27.66 kg/m²     BP Readings from Last 3 Encounters:   23 120/76   11/10/23 118/80   23 118/68        Wt Readings from Last 3 Encounters:   23 77.7 kg (171 lb 6.4 oz)   23 80.7 kg (178 lb)   22 72.5 kg (159 lb 12.8 oz)       Physical Exam    General: NAD  HEENT: NCAT, EOMI, normal conjunctiva, bulging left tympanic membrane with purulent fluid  Cardiovascular: RRR, normal S1 and S2, no m/r/g  Pulmonary: Normal respiratory effort, no wheezes, rales or rhonchi  Extremities: No lower extremity edema  Skin: Normal skin color, no rashes     Srinivas Bishop MD

## 2023-12-14 ENCOUNTER — OFFICE VISIT (OUTPATIENT)
Dept: INTERNAL MEDICINE CLINIC | Facility: CLINIC | Age: 23
End: 2023-12-14
Payer: COMMERCIAL

## 2023-12-14 VITALS
SYSTOLIC BLOOD PRESSURE: 114 MMHG | OXYGEN SATURATION: 96 % | HEIGHT: 66 IN | BODY MASS INDEX: 27.35 KG/M2 | DIASTOLIC BLOOD PRESSURE: 80 MMHG | HEART RATE: 76 BPM | WEIGHT: 170.2 LBS

## 2023-12-14 DIAGNOSIS — J01.00 ACUTE NON-RECURRENT MAXILLARY SINUSITIS: Primary | ICD-10-CM

## 2023-12-14 DIAGNOSIS — H65.22 LEFT CHRONIC SEROUS OTITIS MEDIA: ICD-10-CM

## 2023-12-14 DIAGNOSIS — L40.9 PSORIASIS: ICD-10-CM

## 2023-12-14 PROCEDURE — 99214 OFFICE O/P EST MOD 30 MIN: CPT | Performed by: INTERNAL MEDICINE

## 2023-12-14 RX ORDER — FLUTICASONE PROPIONATE 50 MCG
1 SPRAY, SUSPENSION (ML) NASAL DAILY
Qty: 9.9 ML | Refills: 0 | Status: SHIPPED | OUTPATIENT
Start: 2023-12-14

## 2023-12-14 RX ORDER — AZITHROMYCIN 250 MG/1
TABLET, FILM COATED ORAL DAILY
Qty: 6 TABLET | Refills: 0 | Status: SHIPPED | OUTPATIENT
Start: 2023-12-14 | End: 2023-12-19

## 2023-12-14 NOTE — ASSESSMENT & PLAN NOTE
-Suspect the patient's symptoms are likely viral at this time however since she will be leaving for trip and will be out of the [place] I will give her a Z-Alvin to initiate if needed.   -Recommended she start Flonase 1 spray in each nostril daily for sinus congestion

## 2023-12-14 NOTE — ASSESSMENT & PLAN NOTE
-Patient is experiencing an acute flare of her chronic psoriasis  -She contacted dermatology and is now applying desonide cream

## 2023-12-14 NOTE — ASSESSMENT & PLAN NOTE
-Infection previously involving left TM has resolved with antibiotic therapy. There is still residual fluid. Discussed this could potentially be due to some eustachian tube dysfunction. Recommended trial of Flonase 1 spray in each nostril daily over the next 2 weeks. If there is no improvement a referral was made to ENT.

## 2023-12-14 NOTE — PROGRESS NOTES
Name: Deng Scott      : 2000      MRN: [id number]  Encounter Provider: Becka Agosto MD  Encounter Date: 2023   [facility]    Assessment & Plan     1. Acute non-recurrent maxillary sinusitis  Assessment & Plan:  -Suspect the patient's symptoms are likely viral at this time however since she will be leaving for trip and will be out of the country I will give her a Z-Alvin to initiate if needed. -Recommended she start Flonase 1 spray in each nostril daily for sinus congestion    Orders:  -     azithromycin (Zithromax) 250 mg tablet; Take 2 tablets (500 mg total) by mouth daily for 1 day, THEN 1 tablet (250 mg total) daily for 4 days. -     fluticasone (FLONASE) 50 mcg/act nasal spray; 1 spray into each nostril daily    2. Left chronic serous otitis media  Assessment & Plan:  -Infection previously involving left TM has resolved with antibiotic therapy. There is still residual fluid. Discussed this could potentially be due to some eustachian tube dysfunction. Recommended trial of Flonase 1 spray in each nostril daily over the next 2 weeks. If there is no improvement a referral was made to ENT. Orders:  -     fluticasone (FLONASE) 50 mcg/act nasal spray; 1 spray into each nostril daily    3. Psoriasis  Assessment & Plan:  -Patient is experiencing an acute flare of her chronic psoriasis  -She contacted dermatology and is now applying desonide cream          Depression Screening and Follow-up Plan: Patient was screened for depression during today's encounter. They screened negative with a PHQ-2 score of 0. Subjective      HPI  Patient presents today as an acute visit. She reports she has been experiencing cough and cold symptoms over the past several days. She endorses cough in addition to sinus pain/pressure. Of note, she states she will be leaving on a cruise for the [place] this .       She also states that since her cold symptoms started she has had a flare of her psoriasis involving her eyebrows and neck. She reports typically this happens when she has a bacterial infection. She contacted her dermatologist and was started on desonide cream.    Lastly, she reports she continues to have fullness in her left ear. I saw the patient on 11/17 at which time she was treated for an acute suppurative otitis media. She reports the pain and discomfort has completely resolved however she still experiences occasional clicking in her left ear as well as fullness. All other systems negative except for pertinent findings noted in HPI. Current Outpatient Medications on File Prior to Visit   Medication Sig   • beclomethasone (QVAR) 80 MCG/ACT inhaler Inhale 2 puffs 2 (two) times a day Rinse mouth after use.    • desonide (DESOWEN) 0.05 % cream Apply topically 2 (two) times a day   • Vienva 0.1-20 MG-MCG per tablet TAKE 1 TABLET BY MOUTH EVERY DAY       Objective     /80   Pulse 76   Ht 5' 6" (1.676 m)   Wt 77.2 kg (170 lb 3.2 oz)   SpO2 96%   BMI 27.47 kg/m²     BP Readings from Last 3 Encounters:   12/14/23 114/80   11/17/23 120/76   11/10/23 118/80        Wt Readings from Last 3 Encounters:   12/14/23 77.2 kg (170 lb 3.2 oz)   11/17/23 77.7 kg (171 lb 6.4 oz)   06/07/23 80.7 kg (178 lb)       Physical Exam    General: NAD  HEENT: NCAT, EOMI, normal conjunctiva, bilateral maxillary sinus tenderness, left TM fullness with clear fluid  Cardiovascular: RRR, normal S1 and S2, no m/r/g  Pulmonary: Normal respiratory effort, no wheezes, rales or rhonchi  MSK: Normal bulk and tone  Extremities: No lower extremity edema  Skin: Erythematous patches involving bilateral eyebrow and posterior neck    Srinivas Woodall MD

## 2024-01-06 DIAGNOSIS — H65.22 LEFT CHRONIC SEROUS OTITIS MEDIA: ICD-10-CM

## 2024-01-06 DIAGNOSIS — J01.00 ACUTE NON-RECURRENT MAXILLARY SINUSITIS: ICD-10-CM

## 2024-01-08 RX ORDER — FLUTICASONE PROPIONATE 50 MCG
SPRAY, SUSPENSION (ML) NASAL
Qty: 48 ML | Refills: 1 | Status: SHIPPED | OUTPATIENT
Start: 2024-01-08

## 2024-02-12 PROBLEM — J01.00 ACUTE NON-RECURRENT MAXILLARY SINUSITIS: Status: RESOLVED | Noted: 2023-12-14 | Resolved: 2024-02-12

## 2024-03-20 DIAGNOSIS — Z30.011 ENCOUNTER FOR PRESCRIPTION OF ORAL CONTRACEPTIVES: ICD-10-CM

## 2024-03-20 RX ORDER — TIMOLOL MALEATE 5 MG/ML
SOLUTION/ DROPS OPHTHALMIC
Qty: 84 TABLET | Refills: 1 | Status: SHIPPED | OUTPATIENT
Start: 2024-03-20

## 2024-06-18 ENCOUNTER — ANNUAL EXAM (OUTPATIENT)
Dept: OBGYN CLINIC | Facility: CLINIC | Age: 24
End: 2024-06-18
Payer: COMMERCIAL

## 2024-06-18 ENCOUNTER — APPOINTMENT (OUTPATIENT)
Dept: LAB | Facility: AMBULARY SURGERY CENTER | Age: 24
End: 2024-06-18
Payer: COMMERCIAL

## 2024-06-18 VITALS
SYSTOLIC BLOOD PRESSURE: 112 MMHG | DIASTOLIC BLOOD PRESSURE: 64 MMHG | HEIGHT: 66 IN | BODY MASS INDEX: 25.88 KG/M2 | WEIGHT: 161 LBS

## 2024-06-18 DIAGNOSIS — Z11.3 SCREENING FOR STD (SEXUALLY TRANSMITTED DISEASE): ICD-10-CM

## 2024-06-18 DIAGNOSIS — Z30.011 ENCOUNTER FOR PRESCRIPTION OF ORAL CONTRACEPTIVES: ICD-10-CM

## 2024-06-18 DIAGNOSIS — Z01.419 WELL WOMAN EXAM WITH ROUTINE GYNECOLOGICAL EXAM: Primary | ICD-10-CM

## 2024-06-18 LAB
HBV SURFACE AG SER QL: NORMAL
HCV AB SER QL: NORMAL
HIV 1+2 AB+HIV1 P24 AG SERPL QL IA: NORMAL
HIV 2 AB SERPL QL IA: NORMAL
HIV1 AB SERPL QL IA: NORMAL
HIV1 P24 AG SERPL QL IA: NORMAL
TREPONEMA PALLIDUM IGG+IGM AB [PRESENCE] IN SERUM OR PLASMA BY IMMUNOASSAY: NORMAL

## 2024-06-18 PROCEDURE — 87340 HEPATITIS B SURFACE AG IA: CPT

## 2024-06-18 PROCEDURE — S0612 ANNUAL GYNECOLOGICAL EXAMINA: HCPCS

## 2024-06-18 PROCEDURE — 36415 COLL VENOUS BLD VENIPUNCTURE: CPT

## 2024-06-18 PROCEDURE — 86780 TREPONEMA PALLIDUM: CPT

## 2024-06-18 PROCEDURE — 87591 N.GONORRHOEAE DNA AMP PROB: CPT

## 2024-06-18 PROCEDURE — 86803 HEPATITIS C AB TEST: CPT

## 2024-06-18 PROCEDURE — 87491 CHLMYD TRACH DNA AMP PROBE: CPT

## 2024-06-18 PROCEDURE — 87389 HIV-1 AG W/HIV-1&-2 AB AG IA: CPT

## 2024-06-18 RX ORDER — LEVONORGESTREL/ETHIN.ESTRADIOL 0.1-0.02MG
1 TABLET ORAL DAILY
Qty: 84 TABLET | Refills: 4 | Status: SHIPPED | OUTPATIENT
Start: 2024-06-18

## 2024-06-18 NOTE — PROGRESS NOTES
ASSESSMENT & PLAN:   Diagnoses and all orders for this visit:    Well woman exam with routine gynecological exam    Encounter for prescription of oral contraceptives  -     levonorgestrel-ethinyl estradiol (Vienva) 0.1-20 MG-MCG per tablet; Take 1 tablet by mouth daily    Screening for STD (sexually transmitted disease)  -     Hepatitis C antibody; Future  -     Chlamydia/GC amplified DNA by PCR  -     Hepatitis B surface antigen; Future  -     HIV 1/2 AG/AB w Reflex [facility] for 2 yr old and above; Future  -     RPR-Syphilis Screening (Total Syphilis IGG/IGM); Future      The following were reviewed in today's visit: ASCCP guidelines, Gardisil vaccination, STD testing breast self exam, STD testing, exercise, and healthy diet.    Patient to return to office in yearly for annual exam.     All questions have been answered to her satisfaction.    CC:  Annual Gynecologic Examination  Chief Complaint   Patient presents with    Gynecologic Exam     Pt is here for her yearly exam. Pap utd.   Neg pap 12/3/19, 22          HPI: Radha Sanchez is a 23 y.o.  who presents for annual gynecologic examination.  She has the following concerns:  Patient is currently taking Vienva OCP but has been out of pills for ~1 week. She would like to restart. Denies unprotected intercourse within past 2 weeks. She will plan to begin Vienva again when she picks it up from the pharmacy. She has been on this OCP for about 2-3 years and is doing well on it. She was previously on another OCP (Tri-Femynor) that made her mood very labile and she notes improvement since beginning Vienva. She would like STD screening tests today.       Health Maintenance:    Exercise: frequently  Breast exams/breast awareness: yes    Past Medical History:   Diagnosis Date    Asthma 2015    Neurologic disorder     Black outs    Varicella      Past Surgical History:   Procedure Laterality Date    WISDOM TOOTH EXTRACTION       Past OB/Gyn History:  Period  Cycle (Days): 24  Period Duration (Days): 6  Period Pattern: Regular  Menstrual Flow: Moderate, Light  Dysmenorrhea: NonePatient's last menstrual period was 06/09/2024 (exact date).    Last Pap: 4/29/2022 : no abnormalities  History of abnormal Pap smear: no  HPV vaccine completed: yes    Patient is currently sexually active.   STD testing: yes  Current contraception: OCP (estrogen/progesterone)    Family History  Family History   Problem Relation Age of Onset    Cancer Maternal Grandmother     Arthritis Family     Osteoarthritis Family     Hypertension Family     Varicose Veins Family         lower extremities    Stroke Other     Multiple sclerosis Mother     Stroke Maternal Grandfather         At least 2    Kidney disease Maternal Grandfather     Uterine cancer Maternal Aunt      Family history of uterine or ovarian cancer: yes, maternal aunt - uterine  Family history of breast cancer: no  Family history of colon cancer: no    Social History:  Social History     Socioeconomic History    Marital status: Single     Spouse name: Not on file    Number of children: Not on file    Years of education: Not on file    Highest education level: Not on file   Occupational History    Not on file   Tobacco Use    Smoking status: Never    Smokeless tobacco: Never   Vaping Use    Vaping status: Former    Start date: 6/13/2018    Quit date: 7/14/2021    Substances: Nicotine, Flavoring   Substance and Sexual Activity    Alcohol use: Yes     Alcohol/week: 1.0 - 3.0 standard drink of alcohol     Types: 1 - 3 Glasses of wine per week     Comment: social    Drug use: Not Currently     Types: Marijuana     Comment: former    Sexual activity: Yes     Partners: Male     Birth control/protection: OCP   Other Topics Concern    Not on file   Social History Narrative    Daily caffeinated cola consumption    Drinks coffee     Social Determinants of Health     Financial Resource Strain: Not on file   Food Insecurity: Not on file  "  Transportation Needs: Not on file   Physical Activity: Not on file   Stress: Not on file   Social Connections: Not on file   Intimate Partner Violence: Not on file   Housing Stability: Not on file     Domestic violence screen: negative    Allergies:  No Known Allergies    Medications:    Current Outpatient Medications:     beclomethasone (QVAR) 80 MCG/ACT inhaler, Inhale 2 puffs 2 (two) times a day Rinse mouth after use., Disp: 8.7 g, Rfl: 1    desonide (DESOWEN) 0.05 % cream, Apply topically 2 (two) times a day, Disp: 30 g, Rfl: 0    levonorgestrel-ethinyl estradiol (Vienva) 0.1-20 MG-MCG per tablet, Take 1 tablet by mouth daily, Disp: 84 tablet, Rfl: 4    fluticasone (FLONASE) 50 mcg/act nasal spray, SPRAY 1 SPRAY INTO EACH NOSTRIL EVERY DAY, Disp: 48 mL, Rfl: 1    Review of Systems:  Review of Systems   Constitutional:  Negative for chills and fever.   Respiratory:  Negative for shortness of breath.    Cardiovascular:  Negative for chest pain.   Gastrointestinal:  Negative for abdominal pain, constipation and diarrhea.   Genitourinary:  Negative for dysuria, frequency, pelvic pain, vaginal discharge and vaginal pain.   Psychiatric/Behavioral:  Negative for self-injury and suicidal ideas.          Physical Exam:  /64 (BP Location: Right arm, Patient Position: Sitting, Cuff Size: Standard)   Ht 5' 6\" (1.676 m)   Wt 73 kg (161 lb)   LMP 06/09/2024 (Exact Date)   BMI 25.99 kg/m²    Physical Exam  Constitutional:       Appearance: Normal appearance.   Genitourinary:      Vulva and urethral meatus normal.      No labial fusion noted.      No vaginal erythema or tenderness.        Right Adnexa: not tender.     Left Adnexa: not tender.     No cervical discharge or friability.      Uterus is not tender.   Breasts:     Breasts are symmetrical.      Right: Normal.      Left: Normal.   HENT:      Head: Normocephalic and atraumatic.   Neck:      Thyroid: No thyroid mass or thyroid tenderness.   Cardiovascular:     "  Rate and Rhythm: Normal rate and regular rhythm.      Heart sounds: Normal heart sounds. No murmur heard.  Pulmonary:      Effort: Pulmonary effort is normal.      Breath sounds: Normal breath sounds. No wheezing.   Abdominal:      Palpations: Abdomen is soft. There is no mass.      Tenderness: There is no abdominal tenderness.   Lymphadenopathy:      Cervical: No cervical adenopathy.   Neurological:      General: No focal deficit present.      Mental Status: She is alert and oriented to person, place, and time.   Skin:     General: Skin is warm and dry.   Psychiatric:         Mood and Affect: Mood normal.         Behavior: Behavior normal.   Vitals and nursing note reviewed.

## 2024-06-19 LAB
C TRACH DNA SPEC QL NAA+PROBE: NEGATIVE
N GONORRHOEA DNA SPEC QL NAA+PROBE: NEGATIVE

## 2024-08-14 DIAGNOSIS — Z30.011 ENCOUNTER FOR PRESCRIPTION OF ORAL CONTRACEPTIVES: ICD-10-CM

## 2024-08-15 RX ORDER — LEVONORGESTREL/ETHIN.ESTRADIOL 0.1-0.02MG
1 TABLET ORAL DAILY
Qty: 84 TABLET | Refills: 0 | OUTPATIENT
Start: 2024-08-15

## 2024-11-15 ENCOUNTER — OFFICE VISIT (OUTPATIENT)
Dept: INTERNAL MEDICINE CLINIC | Facility: CLINIC | Age: 24
End: 2024-11-15
Payer: COMMERCIAL

## 2024-11-15 ENCOUNTER — APPOINTMENT (OUTPATIENT)
Dept: LAB | Facility: CLINIC | Age: 24
End: 2024-11-15
Payer: COMMERCIAL

## 2024-11-15 ENCOUNTER — HOSPITAL ENCOUNTER (OUTPATIENT)
Dept: RADIOLOGY | Facility: HOSPITAL | Age: 24
Discharge: HOME | End: 2024-11-15
Payer: COMMERCIAL

## 2024-11-15 VITALS
WEIGHT: 170.2 LBS | HEART RATE: 62 BPM | OXYGEN SATURATION: 97 % | RESPIRATION RATE: 16 BRPM | DIASTOLIC BLOOD PRESSURE: 66 MMHG | SYSTOLIC BLOOD PRESSURE: 104 MMHG | HEIGHT: 66 IN | BODY MASS INDEX: 27.35 KG/M2

## 2024-11-15 DIAGNOSIS — R07.9 CHEST PAIN, UNSPECIFIED TYPE: Primary | ICD-10-CM

## 2024-11-15 DIAGNOSIS — R07.9 CHEST PAIN, UNSPECIFIED TYPE: ICD-10-CM

## 2024-11-15 LAB — D DIMER PPP FEU-MCNC: <0.27 UG/ML FEU

## 2024-11-15 PROCEDURE — 71046 X-RAY EXAM CHEST 2 VIEWS: CPT

## 2024-11-15 PROCEDURE — 85379 FIBRIN DEGRADATION QUANT: CPT

## 2024-11-15 PROCEDURE — 36415 COLL VENOUS BLD VENIPUNCTURE: CPT

## 2024-11-15 PROCEDURE — 99214 OFFICE O/P EST MOD 30 MIN: CPT | Performed by: INTERNAL MEDICINE

## 2024-11-15 RX ORDER — CLOBETASOL PROPIONATE 0.5 MG/ML
SOLUTION TOPICAL
COMMUNITY
Start: 2024-08-13

## 2024-11-15 RX ORDER — HYDROCORTISONE 25 MG/G
1 CREAM TOPICAL 2 TIMES DAILY
COMMUNITY
Start: 2024-08-13

## 2024-11-15 NOTE — PROGRESS NOTES
Name: Radha Sanchez      : 2000      MRN: [id number]  Encounter Provider: Lea Reyes, MD  Encounter Date: 11/15/2024   Encounter department: [facility]    Assessment & Plan  Chest pain, unspecified type  Right-sided chest pain close to the axilla that is not reproducible but relieved by ibuprofen and worse with inspiration  Musculoskeletal versus atypical pneumonia versus PE  Obtain chest x-ray rule out pneumonia  Obtain D-dimer and discussed that if it is elevated she will need a CT scan of her chest  Orders:    D-dimer, quantitative; Future    XR chest pa and lateral; Future         History of Present Illness     Started with right-sided chest pain yesterday while sitting eating at [employer]  It persisted when she went home and she took ibuprofen which helped.  She was able to sleep through the night but at 6 AM she woke up with the same chest pain that is worse with inspiration.  Denies fever or chills but has had a mild cough faint wheezing and shortness of breath.  The area is not particularly tender to touch  She is on oral contraceptive  Denies personal family history of blood clots  She has not been sedentary    Chest Pain   Associated symptoms include a cough and shortness of breath. Pertinent negatives include no abdominal pain, fever or palpitations.     Review of Systems   Constitutional:  Negative for chills and fever.   HENT:  Negative for congestion, rhinorrhea and sore throat.    Respiratory:  Positive for cough, shortness of breath and wheezing.    Cardiovascular:  Positive for chest pain. Negative for palpitations.   Gastrointestinal:  Negative for abdominal pain, constipation and diarrhea.     Past Medical History:   Diagnosis Date    Asthma 2015    Neurologic disorder     Black outs    Varicella      Past Surgical History:   Procedure Laterality Date    WISDOM TOOTH EXTRACTION       Family History   Problem Relation Age of Onset    Cancer Maternal Grandmother  "    Arthritis Maternal Grandmother     Arthritis Family     Osteoarthritis Family     Hypertension Family     Varicose Veins Family         lower extremities    Stroke Other     Multiple sclerosis Mother     Stroke Maternal Grandfather         At least 2    Kidney disease Maternal Grandfather     Uterine cancer Maternal Aunt      Social History     Tobacco Use    Smoking status: Never    Smokeless tobacco: Never   Vaping Use    Vaping status: Former    Start date: 6/13/2018    Quit date: 7/14/2021    Substances: Nicotine, Flavoring   Substance and Sexual Activity    Alcohol use: Yes     Alcohol/week: 1.0 - 3.0 standard drink of alcohol     Types: 1 - 3 Glasses of wine per week     Comment: social    Drug use: Not Currently     Types: Marijuana     Comment: former    Sexual activity: Yes     Partners: Male     Birth control/protection: OCP     Current Outpatient Medications on File Prior to Visit   Medication Sig    clobetasol (TEMOVATE) 0.05 % external solution Apply topically    desonide (DESOWEN) 0.05 % cream Apply topically 2 (two) times a day    hydrocortisone 2.5 % cream Apply 1 Application topically 2 (two) times a day To affected area    levonorgestrel-ethinyl estradiol (Vienva) 0.1-20 MG-MCG per tablet Take 1 tablet by mouth daily    beclomethasone (QVAR) 80 MCG/ACT inhaler Inhale 2 puffs 2 (two) times a day Rinse mouth after use. (Patient not taking: Reported on 11/15/2024)    [DISCONTINUED] fluticasone (FLONASE) 50 mcg/act nasal spray SPRAY 1 SPRAY INTO EACH NOSTRIL EVERY DAY     No Known Allergies  Immunization History   Administered Date(s) Administered    INFLUENZA 12/17/2009, 12/11/2010, 11/03/2011, 12/17/2012, 10/16/2013, 11/15/2014, 11/14/2015     Objective   /66 (BP Location: Left arm, Patient Position: Sitting, Cuff Size: Standard)   Pulse 62   Resp 16   Ht 5' 6\" (1.676 m)   Wt 77.2 kg (170 lb 3.2 oz)   SpO2 97%   BMI 27.47 kg/m²     Physical Exam  Constitutional:       General: She is " not in acute distress.     Appearance: She is well-developed. She is not ill-appearing, toxic-appearing or diaphoretic.   HENT:      Right Ear: External ear normal. There is no impacted cerumen.      Left Ear: External ear normal. There is no impacted cerumen.   Eyes:      Conjunctiva/sclera: Conjunctivae normal.   Cardiovascular:      Rate and Rhythm: Normal rate and regular rhythm.      Heart sounds: Normal heart sounds. No murmur heard.  Pulmonary:      Effort: Pulmonary effort is normal. No respiratory distress.      Breath sounds: Normal breath sounds. No stridor. No wheezing or rales.   Chest:      Chest wall: No tenderness.   Abdominal:      General: There is no distension.      Palpations: Abdomen is soft. There is no mass.      Tenderness: There is no abdominal tenderness. There is no guarding or rebound.   Musculoskeletal:      Cervical back: Neck supple.      Right lower leg: No edema.      Left lower leg: No edema.   Neurological:      Mental Status: She is alert and oriented to person, place, and time.   Psychiatric:         Mood and Affect: Mood normal.         Behavior: Behavior normal.         Thought Content: Thought content normal.         Judgment: Judgment normal.

## 2024-11-18 ENCOUNTER — TELEPHONE (OUTPATIENT)
Age: 24
End: 2024-11-18

## 2024-11-18 ENCOUNTER — RESULTS FOLLOW-UP (OUTPATIENT)
Dept: INTERNAL MEDICINE CLINIC | Facility: CLINIC | Age: 24
End: 2024-11-18

## 2024-11-18 DIAGNOSIS — J45.30 MILD PERSISTENT ASTHMA WITHOUT COMPLICATION: Primary | ICD-10-CM

## 2024-11-18 RX ORDER — BECLOMETHASONE DIPROPIONATE HFA 80 UG/1
2 AEROSOL, METERED RESPIRATORY (INHALATION) 2 TIMES DAILY
Qty: 10.6 G | Refills: 1 | Status: SHIPPED | OUTPATIENT
Start: 2024-11-18 | End: 2024-11-19

## 2024-11-18 NOTE — TELEPHONE ENCOUNTER
I will send the inhaler  Please also inform her that as she saw, her chest x-ray and her blood work was normal.  The blood work did not point to blood clot in the chest

## 2024-11-18 NOTE — TELEPHONE ENCOUNTER
Message sent via [facility].   beclomethasone 80 MCG/ACT inhaler     Commonly known as: QVAR     My current inhaler is  and I believe this will assist me with the wheezing discussed at my most recent appointment with you.

## 2024-11-19 DIAGNOSIS — J45.30 MILD PERSISTENT ASTHMA WITHOUT COMPLICATION: Primary | ICD-10-CM

## 2024-11-19 RX ORDER — FLUTICASONE PROPIONATE AND SALMETEROL 100; 50 UG/1; UG/1
1 POWDER RESPIRATORY (INHALATION) 2 TIMES DAILY
Qty: 60 BLISTER | Refills: 1 | Status: SHIPPED | OUTPATIENT
Start: 2024-11-19 | End: 2025-05-18

## 2025-06-20 ENCOUNTER — ANNUAL EXAM (OUTPATIENT)
Dept: OBGYN CLINIC | Facility: CLINIC | Age: 25
End: 2025-06-20
Payer: COMMERCIAL

## 2025-06-20 VITALS
HEIGHT: 66 IN | BODY MASS INDEX: 27 KG/M2 | SYSTOLIC BLOOD PRESSURE: 120 MMHG | WEIGHT: 168 LBS | DIASTOLIC BLOOD PRESSURE: 80 MMHG

## 2025-06-20 DIAGNOSIS — Z30.011 ENCOUNTER FOR PRESCRIPTION OF ORAL CONTRACEPTIVES: ICD-10-CM

## 2025-06-20 DIAGNOSIS — Z01.419 WELL WOMAN EXAM WITH ROUTINE GYNECOLOGICAL EXAM: Primary | ICD-10-CM

## 2025-06-20 DIAGNOSIS — Z11.3 ROUTINE SCREENING FOR STI (SEXUALLY TRANSMITTED INFECTION): ICD-10-CM

## 2025-06-20 DIAGNOSIS — Z01.419 ROUTINE GYNECOLOGICAL EXAMINATION: ICD-10-CM

## 2025-06-20 DIAGNOSIS — Z12.4 SCREENING FOR MALIGNANT NEOPLASM OF THE CERVIX: ICD-10-CM

## 2025-06-20 DIAGNOSIS — L29.2 VULVAR ITCHING: ICD-10-CM

## 2025-06-20 PROCEDURE — 87591 N.GONORRHOEAE DNA AMP PROB: CPT | Performed by: PHYSICIAN ASSISTANT

## 2025-06-20 PROCEDURE — 87661 TRICHOMONAS VAGINALIS AMPLIF: CPT | Performed by: PHYSICIAN ASSISTANT

## 2025-06-20 PROCEDURE — 87491 CHLMYD TRACH DNA AMP PROBE: CPT | Performed by: PHYSICIAN ASSISTANT

## 2025-06-20 PROCEDURE — G0143 SCR C/V CYTO,THINLAYER,RESCR: HCPCS | Performed by: PHYSICIAN ASSISTANT

## 2025-06-20 PROCEDURE — S0612 ANNUAL GYNECOLOGICAL EXAMINA: HCPCS | Performed by: PHYSICIAN ASSISTANT

## 2025-06-20 RX ORDER — LEVONORGESTREL/ETHIN.ESTRADIOL 0.1-0.02MG
1 TABLET ORAL DAILY
Qty: 84 TABLET | Refills: 4 | Status: SHIPPED | OUTPATIENT
Start: 2025-06-20

## 2025-06-20 RX ORDER — CLOBETASOL PROPIONATE 0.5 MG/G
OINTMENT TOPICAL 2 TIMES DAILY
Qty: 60 G | Refills: 0 | Status: SHIPPED | OUTPATIENT
Start: 2025-06-20

## 2025-06-24 LAB
C TRACH DNA SPEC QL NAA+PROBE: NEGATIVE
N GONORRHOEA DNA SPEC QL NAA+PROBE: NEGATIVE
T VAGINALIS DNA SPEC QL NAA+PROBE: NEGATIVE

## 2025-06-25 LAB
LAB AP GYN PRIMARY INTERPRETATION: NORMAL
Lab: NORMAL
PATH INTERP SPEC-IMP: NORMAL

## 2025-07-03 ENCOUNTER — TELEPHONE (OUTPATIENT)
Dept: INTERNAL MEDICINE CLINIC | Facility: CLINIC | Age: 25
End: 2025-07-03

## 2025-07-03 ENCOUNTER — NURSE TRIAGE (OUTPATIENT)
Age: 25
End: 2025-07-03
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
## 2025-07-03 NOTE — TELEPHONE ENCOUNTER
Patient is unable to make it to appt today for tick bite, she wants to know If you can put in for lab work to have done and will make it to scheduled appt with resident on tues

## 2025-07-03 NOTE — TELEPHONE ENCOUNTER
"REASON FOR CONVERSATION: Tick Bite    SYMPTOMS: patient removed a deer tick from the upper right thigh this morning. States that it could have been there for 1 day. Patient removed it fully and states it might have been slightly swollen. Denies rash, joint pain, fever. States that she has been having mild diarrhea since Tuesday. Would like to know if pcp would recommend testing for lymes.     OTHER HEALTH INFORMATION: No    PROTOCOL DISPOSITION: Home Care    CARE ADVICE PROVIDED: none    PRACTICE FOLLOW-UP: callback        Reason for Disposition   Tick bite with no complications    Answer Assessment - Initial Assessment Questions  1. ATTACHED:  \"Is the tick still on the skin?\"  (e.g., yes, no, unsure)      No  2. ONSET - TICK STILL ATTACHED:  \"How long do you think the tick has been on your skin?\" (e.g., hours, days, unsure)  Note:  Is there a recent activity (camping, hiking) where the caller may have been exposed?      No  3. ONSET - TICK NOT STILL ATTACHED: \"If the tick has been removed, how long do you think the tick was attached before you removed it?\" (e.g., 5 hours, 2 days). \"When was this?\"      1 day   4. LOCATION: \"Where is the tick bite located?\" (e.g., arm, leg)      Upper right thigh  5. TYPE of TICK: \"Is it a wood tick or a deer tick?\" (e.g., deer tick, wood tick; unsure)      deer  6. SIZE of TICK: \"How big is the tick?\" (e.g., size of poppy seed, apple seed, watermelon seed; unsure) Note: Deer ticks can be the size of a poppy seed (nymph) or an apple seed (adult).        poppy  7. ENGORGED: \"Did the tick look flat or engorged (full, swollen)?\" (e.g., flat, engorged; unsure)      unsure  8. OTHER SYMPTOMS: \"Do you have any other symptoms?\" (e.g., fever, rash, redness at bite area, red ring around bite)      Diarrhea for Tuesday-4 times per day  9. PREGNANCY: \"Is there any chance you are pregnant?\" \"When was your last menstrual period?\"      No    Protocols used: Tick Bite-Adult-[place]    "

## 2025-07-08 ENCOUNTER — OFFICE VISIT (OUTPATIENT)
Dept: INTERNAL MEDICINE CLINIC | Facility: CLINIC | Age: 25
End: 2025-07-08
Payer: COMMERCIAL

## 2025-07-08 ENCOUNTER — APPOINTMENT (OUTPATIENT)
Dept: LAB | Facility: CLINIC | Age: 25
End: 2025-07-08
Attending: INTERNAL MEDICINE
Payer: COMMERCIAL

## 2025-07-08 VITALS
BODY MASS INDEX: 27.6 KG/M2 | DIASTOLIC BLOOD PRESSURE: 64 MMHG | HEART RATE: 72 BPM | WEIGHT: 171 LBS | OXYGEN SATURATION: 96 % | SYSTOLIC BLOOD PRESSURE: 100 MMHG
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
## 2025-07-08 DIAGNOSIS — R53.83 FATIGUE, UNSPECIFIED TYPE: ICD-10-CM
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
## 2025-07-08 PROCEDURE — 99213 OFFICE O/P EST LOW 20 MIN: CPT

## 2025-07-08 PROCEDURE — 86618 LYME DISEASE ANTIBODY: CPT

## 2025-07-08 PROCEDURE — 36415 COLL VENOUS BLD VENIPUNCTURE: CPT

## 2025-07-08 NOTE — PROGRESS NOTES
Name: Radha Sanchez      : 2000      MRN: [id number]  Encounter Provider: Tye Rosas MD  Encounter Date: 2025   Encounter department: [facility]  :  Assessment & Plan  Tick bite  Patient had a tick bite on last Thursday, was able to remove the tick  Patient complains of generalized fatigue however no rash, no joint pain, no fevers or chills  Patient is out of the prophylactic.    PLAn:  No need for treatment currently  Follow-up with Lyme antibodies              History of Present Illness   HPI 24-year-old female presented to the clinic after having a setback last Thursday.  Patient was able to remove the trach.  Patient complains of generalized fatigue however no rash or any other symptoms.  Review of Systems   Constitutional:  Positive for fatigue.   HENT: Negative.     Eyes: Negative.    Respiratory: Negative.     Cardiovascular: Negative.    Gastrointestinal: Negative.    Genitourinary: Negative.    Musculoskeletal: Negative.  Negative for arthralgias and myalgias.   Skin:  Negative for rash.   Neurological: Negative.        Objective   /64 (BP Location: Left arm, Patient Position: Sitting, Cuff Size: Standard)   Pulse 72   Wt 77.6 kg (171 lb)   LMP 2025 (Exact Date)   SpO2 96%   BMI 27.60 kg/m²      Physical Exam  Constitutional:       Appearance: Normal appearance.   HENT:      Head: Normocephalic and atraumatic.      Right Ear: External ear normal.      Left Ear: External ear normal.      Nose: Nose normal.      Mouth/Throat:      Mouth: Mucous membranes are moist.      Pharynx: Oropharynx is clear.     Eyes:      Extraocular Movements: Extraocular movements intact.      Conjunctiva/sclera: Conjunctivae normal.      Pupils: Pupils are equal, round, and reactive to light.       Cardiovascular:      Rate and Rhythm: Normal rate and regular rhythm.      Pulses: Normal pulses.      Heart sounds: Normal heart sounds.   Pulmonary:      Effort:  Pulmonary effort is normal.      Breath sounds: Normal breath sounds.   Abdominal:      General: Abdomen is flat.     Musculoskeletal:         General: Normal range of motion.     Skin:     General: Skin is warm.      Capillary Refill: Capillary refill takes less than 2 seconds.     Neurological:      General: No focal deficit present.      Mental Status: She is alert and oriented to person, place, and time.

## 2025-07-08 NOTE — ASSESSMENT & PLAN NOTE
Patient had a tick bite on last Thursday, was able to remove the tick  Patient complains of generalized fatigue however no rash, no joint pain, no fevers or chills  Patient is out of the prophylactic.    PLAn:  No need for treatment currently  Follow-up with Lyme antibodies

## 2025-07-09 LAB — B BURGDOR IGG+IGM SER QL IA: NEGATIVE

## 2025-07-12 PROBLEM — R53.83 FATIGUE: Status: ACTIVE | Noted: 2025-07-12

## 2025-08-20 ENCOUNTER — OFFICE VISIT (OUTPATIENT)
Dept: INTERNAL MEDICINE CLINIC | Facility: CLINIC | Age: 25
End: 2025-08-20
Payer: COMMERCIAL

## 2025-08-20 VITALS
DIASTOLIC BLOOD PRESSURE: 72 MMHG | OXYGEN SATURATION: 97 % | HEART RATE: 73 BPM | TEMPERATURE: 97 F | SYSTOLIC BLOOD PRESSURE: 116 MMHG

## 2025-08-20 DIAGNOSIS — J02.9 SORE THROAT: Primary | ICD-10-CM

## 2025-08-20 PROCEDURE — 99213 OFFICE O/P EST LOW 20 MIN: CPT | Performed by: INTERNAL MEDICINE
